# Patient Record
(demographics unavailable — no encounter records)

---

## 2024-11-11 NOTE — HISTORY OF PRESENT ILLNESS
[FreeTextEntry8] : Pt is an 84 y/o F with PMHx of breast CA, DVT who presents to the office today for eval of leg pain  RLE edema: - few days ago noticed unilateral edema - some "cramping" - has Hx of breast CA and is on Tamoxifen - she has HX of DVT ~8 years ago - no redness, rashes, warmth

## 2024-11-11 NOTE — END OF VISIT
[Time Spent: ___ minutes] : I have spent [unfilled] minutes of time on the encounter which excludes teaching and separately reported services. [FreeTextEntry3] : I, Dr. Rodriguez, personally performed the evaluation and management (E/M) services for this established patient who presents today with (a) new problem(s)/exacerbation of (an) existing condition(s).  That E/M includes conducting the examination, assessing all new/exacerbated conditions, and establishing a new plan of care.  Today, my PA, Stephanie Cheney, was here to observe my evaluation and management services for this new problem/exacerbated condition to be followed going forward.

## 2024-12-09 NOTE — HISTORY OF PRESENT ILLNESS
[de-identified] : 83F with hx of osteoporosis, Breast cancer (1993, 2011, 2024) presents for initial consulation for DVT.. DVT US 11/4- Acute deep venous thrombosis of the distal RIGHT femoral vein, RIGHT popliteal vein, RIGHT posterior tibial vein. Medical hx: recently with breast cancer dx (has had recurrences) s/p RT and now on tamoxifen (5mg daily) since March. Pt did not feel well (sxs of weakness and sore legs and had stopped for a few weeks). Then placed on tamoxifen again. Weekend prior to 11/11- noticed R leg with significant swelling. Started 11/11 Eliquis. Pt has been off tamoxifen since diagnosis then went to second oncologist who has started her on AI (reports patient is on Prolia). Started anastrozole on Saturday. Today, reports improvement in leg pain and swelling. Denies LOC, CP, SOB, abdominal pain, melena, hematochezia.   Low dose RT and lumpectomy ([facility])- Betty Previous DVT 11/2016 - bilateral partially occlussive thrombus w/i posterior tibial veins distal to popliteal; trifurcations (had broken pelvis 2 months prior to US- and had notice leg swelling since broken pelvis); Pt had a fall (off ladder) stayed in bed for 2 days; then called PCP and jhonny to ED --> went to rehab for 5 weeks for broken pelvis. S/p eliquis x 3 months Medications: Eliquis BID, prolia shots, anastrozole Surgeries: hysterectomy, bowel obstruction, lumpectomy x3 Allergies: none  Social hx: no tobacco use, social alcohol use family hx: mother (breast cancer), no family hx of clotting/bleeding disorders Colonoscopy- ~10 years ago WNL

## 2024-12-09 NOTE — ASSESSMENT
[FreeTextEntry1] : 83F with hx of osteoporosis, Breast cancer (1993, 2011, 2024) presents for initial consulation for DVT.. DVT US 11/4- Acute deep venous thrombosis of the distal RIGHT femoral vein, RIGHT popliteal vein, RIGHT posterior tibial vein. Medical hx: recently with breast cancer dx (has had recurrences) s/p RT and now on anastrazole.   # Acute R DVT -Pt currently on Eliquis since 11/11 - discussed continuation with AC for at least 6months; given high risk with recurrence/ malignancy may consider continuing at low dose  - pt will f/u in 2 months - Eliquis sent to pharmacy - f/u with onc regarding breast cancer treatment

## 2024-12-09 NOTE — HISTORY OF PRESENT ILLNESS
[de-identified] : 83F with hx of osteoporosis, Breast cancer (1993, 2011, 2024) presents for initial consulation for DVT.. DVT US 11/4- Acute deep venous thrombosis of the distal RIGHT femoral vein, RIGHT popliteal vein, RIGHT posterior tibial vein. Medical hx: recently with breast cancer dx (has had recurrences) s/p RT and now on tamoxifen (5mg daily) since March. Pt did not feel well (sxs of weakness and sore legs and had stopped for a few weeks). Then placed on tamoxifen again. Weekend prior to 11/11- noticed R leg with significant swelling. Started 11/11 Eliquis. Pt has been off tamoxifen since diagnosis then went to second oncologist who has started her on AI (reports patient is on Prolia). Started anastrozole on Saturday. Today, reports improvement in leg pain and swelling. Denies LOC, CP, SOB, abdominal pain, melena, hematochezia.   Low dose RT and lumpectomy ([facility])- Betty Previous DVT 11/2016 - bilateral partially occlussive thrombus w/i posterior tibial veins distal to popliteal; trifurcations (had broken pelvis 2 months prior to US- and had notice leg swelling since broken pelvis); Pt had a fall (off ladder) stayed in bed for 2 days; then called PCP and jhonny to ED --> went to rehab for 5 weeks for broken pelvis. S/p eliquis x 3 months Medications: Eliquis BID, prolia shots, anastrozole Surgeries: hysterectomy, bowel obstruction, lumpectomy x3 Allergies: none  Social hx: no tobacco use, social alcohol use family hx: mother (breast cancer), no family hx of clotting/bleeding disorders Colonoscopy- ~10 years ago WNL

## 2025-02-10 NOTE — HISTORY OF PRESENT ILLNESS
[de-identified] : 83F with hx of osteoporosis, Breast cancer (1993, 2011, 2024) presents for initial consulation for DVT.. DVT US 11/4- Acute deep venous thrombosis of the distal RIGHT femoral vein, RIGHT popliteal vein, RIGHT posterior tibial vein. Medical hx: recently with breast cancer dx (has had recurrences) s/p RT and now on tamoxifen (5mg daily) since March. Pt did not feel well (sxs of weakness and sore legs and had stopped for a few weeks). Then placed on tamoxifen again. Weekend prior to 11/11- noticed R leg with significant swelling. Started 11/11 Eliquis. Pt has been off tamoxifen since diagnosis then went to second oncologist who has started her on AI (reports patient is on Prolia). Started anastrozole on Saturday.   Low dose RT and lumpectomy ([facility])- Betty Previous DVT 11/2016 - bilateral partially occlussive thrombus w/i posterior tibial veins distal to popliteal; trifurcations (had broken pelvis 2 months prior to US- and had notice leg swelling since broken pelvis); Pt had a fall (off ladder) stayed in bed for 2 days; then called PCP and jhonny to ED --> went to rehab for 5 weeks for broken pelvis. S/p eliquis x 3 months Medications: Eliquis BID, prolia shots, anastrozole Surgeries: hysterectomy, bowel obstruction, lumpectomy x3 Allergies: none Social hx: no tobacco use, social alcohol use family hx: mother (breast cancer), no family hx of clotting/bleeding disorders Colonoscopy- ~10 years ago WNL.   [de-identified] : Reports BRBPR since last week s/p BM - reports 5-6 episodes with clotting. Last episode of bleeding this morning - noted bright blood in toilet. No LOC, dizziness, SOB, CP, abdominal pain. Has been compliant on Eliquis 5 BID. Reports RLE with continue swelling. No pain or erythema. Has been mobile/ active. Currently doing well on anastrozole 5mg daily - no side effects. Prior hx of below the knee DVT s/p injury (provoked). Unsure of hx of hemorrhoids

## 2025-02-10 NOTE — ASSESSMENT
[FreeTextEntry1] : 83F with hx of osteoporosis, Breast cancer (1993, 2011, 2024), prior RLE DVT (provoked i/s/o pelvic fracture) presents for DVT F/U. DVT US 11/4- Acute deep venous thrombosis of the distal RIGHT femoral vein, RIGHT popliteal vein, RIGHT posterior tibial vein.   # Acute R DVT # BRBPR -Pt on Eliquis since 11/11 - discussion was initially patient may have to continue on AC d/t oncology concern for breast cancer recurrence- f/u today for discussion of duration. However, low concern for recurrence per patient discussion with oncology and likely VTE i/s/o tamoxifen use now switched to anastrozole  - today patient has completed 3 months - and now reports BRBPR - given symptoms patient recommended to discontinue Eliquis. Will not require further AC for DVT  - US RLE ordered to assess DVT - last episode of BRBPR this morning  - ****CBC today with Hgb 12.1 --> 9.5 - denies LOC, dizziness, CP, palpitations. With bleeding noted this AM. Pt recommended to go to ED. Eliquis last taken 8am 2/10/24   F/u in 2 weeks

## 2025-02-24 NOTE — PHYSICAL EXAM
[Fully active, able to carry on all pre-disease performance without restriction] : Status 0 - Fully active, able to carry on all pre-disease performance without restriction [Normal] : affect appropriate [de-identified] : normal WoB, CTAB [de-identified] : RRR

## 2025-02-24 NOTE — HISTORY OF PRESENT ILLNESS
[de-identified] : Dulce Maria Raymundo is an 83-year-old woman with history of ER+ breast cancer status post T+C and radiation in 2011 and now on anastrazole (with repeat lumpectomy and radiation 2024) presenting in the setting of newly diagnosed likely metastatic colorectal cancer with disease to the liver and complicated by DVT.   She originally presented to [facility] 2/11/2025 in the setting of rectal bleeding with significant drop in hemoglobin. There, she was found to have a colonoscopy with a high rectal tumor (mod diff, KAYLIN rectal adenocarcinoma). Advanced imaging identified multifocal hepatic metastases in addition to a pancreatic lesion (biopsy c/w low grade NET). Liver lesion biopsy confirmed to be adenocarcinoma, however GATA3 and CDX2 staining is pending to differentiate colorectal from breast origin. Treatment pending result of pathology staining.   Overall well and asymptomatic except for minor shortness of breath. Remains off apixaban at this time, but does not note any new swelling in her b/l lower extremities or worsening in her shortness of breath. Overall functional.  No toxic habits or family history of cancer.    2/12/25: Colonoscopy (Colonoscopy biopsy consistent with KAYLIN mod diff invasive adenocarcinoma at 15 cm from anal verge, mod diff) 2/14/25: EGD with pancreatic lesion biopsy (Consistent with low grade pancreatic NET) 2/21/25: Liver Lesion Biopsy- pending  Sites: Hepatic, rectosigmoid Treatment: None (on anastrazole for ER+ breast cancer) NGS: [facility] pending   [de-identified] : Stopped eliquis 2/10 d/t rectal bleeding no more rectal bleeding + anemia + SOB started 2 weeks ago w/ rectal bleeding. w/ exertion + chills (heating problem in apartment though) denies pain Not applicable

## 2025-02-24 NOTE — RESULTS/DATA
[FreeTextEntry1] : 2/10/25: CT AP- 1. Study protocol not optimized for detection of active gastrointestinal bleeding. Within this limitation, no hemorrhage is identified. 2. Several incidental findings, including: Suspected indeterminate 1.1 cm lesion at left kidney interpole. Pancreatic tail 3.5 cm unilocular cystic lesion. Diagnostic considerations include pancreatic pseudocyst and mucinous cystic neoplasm. Arterially enhancing 0.7 cm lesion in the left hepatic lobe. Recommend multiphase contrast-enhanced MR on nonemergent basis for further evaluation of these lesions.  2/12/25: Colonoscopy  2/12/25: MR Pelvis/Abdomen- *  0.7 cm flash filling hemangioma in the left lateral hepatic lobe corresponding to arterially enhancing focus on CT. *  Additional indeterminate hypovascular lesions in the right and left hepatic lobe are worrisome for metastases. *  3.3 cm annular soft tissue mass in the upper rectum suspicious for rectal neoplasm. No obstruction. *  3.9 x 3.6 cm unilocular thick-walled cystic lesion in the pancreatic tail with internal fluid level is suspicious for mucinous consistent neoplasm versus walled off pancreatic necrosis. Follow-up with GI for endoscopic evaluation. 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  2/13/25: CT Chest- 1.  Clear lungs. 2.  2.3 cm Inferior left thyroid gland nodule. Ultrasound recommended  2/14/25: Thyroid and Parathyroid US- Bilateral thyroid nodules. Consider follow-up ultrasound in one year. TI-RAD 3: Mildly suspicious (FNA if > 2.5 cm, Follow if > 1.5 cm)  2/14/25: EGD  2/14/25: Biopsy- Final Diagnosis 1.  Cecal polyps: -   Tubular adenoma(s) 2.  Ascending colon polyp: -   Tubular adenoma 3.  Transverse colon polyp: -   Tubular adenoma 4.  Rectal tumor, 15 cm from rectum: -   Invasive moderately differentiated colonic adenocarcinoma -   MMR immunohistochemistry shows intact proteins (MLH1, MSH2, MSH6 and PMS2)  2/14/25: Biopsy- Final Diagnosis 1. Fundus: -Gastric mucosa with foveolar hyperplasia  2/21/25: Liver Lesion Biopsy- pending

## 2025-02-24 NOTE — HISTORY OF PRESENT ILLNESS
[de-identified] : Dulce Maria Raymundo is an 83-year-old woman with history of ER+ breast cancer status post T+C and radiation in 2011 and now on anastrazole (with repeat lumpectomy and radiation 2024) presenting in the setting of newly diagnosed likely metastatic colorectal cancer with disease to the liver and complicated by DVT.   She originally presented to [facility] 2/11/2025 in the setting of rectal bleeding with significant drop in hemoglobin. There, she was found to have a colonoscopy with a high rectal tumor (mod diff, KAYLIN rectal adenocarcinoma). Advanced imaging identified multifocal hepatic metastases in addition to a pancreatic lesion (biopsy c/w low grade NET). Liver lesion biopsy confirmed to be adenocarcinoma, however GATA3 and CDX2 staining is pending to differentiate colorectal from breast origin. Treatment pending result of pathology staining.   Overall well and asymptomatic except for minor shortness of breath. Remains off apixaban at this time, but does not note any new swelling in her b/l lower extremities or worsening in her shortness of breath. Overall functional.  No toxic habits or family history of cancer.    2/12/25: Colonoscopy (Colonoscopy biopsy consistent with KAYLIN mod diff invasive adenocarcinoma at 15 cm from anal verge, mod diff) 2/14/25: EGD with pancreatic lesion biopsy (Consistent with low grade pancreatic NET) 2/21/25: Liver Lesion Biopsy- pending  Sites: Hepatic, rectosigmoid Treatment: None (on anastrazole for ER+ breast cancer) NGS: [facility] pending   [de-identified] : Stopped eliquis 2/10 d/t rectal bleeding no more rectal bleeding + anemia + SOB started 2 weeks ago w/ rectal bleeding. w/ exertion + chills (heating problem in apartment though) denies pain

## 2025-02-24 NOTE — HISTORY OF PRESENT ILLNESS
[de-identified] : Dulce Maria Raymundo is an 83-year-old woman with history of ER+ breast cancer status post T+C and radiation in 2011 and now on anastrazole (with repeat lumpectomy and radiation 2024) presenting in the setting of newly diagnosed likely metastatic colorectal cancer with disease to the liver and complicated by DVT.   She originally presented to [facility] 2/11/2025 in the setting of rectal bleeding with significant drop in hemoglobin. There, she was found to have a colonoscopy with a high rectal tumor (mod diff, KAYLIN rectal adenocarcinoma). Advanced imaging identified multifocal hepatic metastases in addition to a pancreatic lesion (biopsy c/w low grade NET). Liver lesion biopsy confirmed to be adenocarcinoma, however GATA3 and CDX2 staining is pending to differentiate colorectal from breast origin. Treatment pending result of pathology staining.   Overall well and asymptomatic except for minor shortness of breath. Remains off apixaban at this time, but does not note any new swelling in her b/l lower extremities or worsening in her shortness of breath. Overall functional.  No toxic habits or family history of cancer.    2/12/25: Colonoscopy (Colonoscopy biopsy consistent with KAYLIN mod diff invasive adenocarcinoma at 15 cm from anal verge, mod diff) 2/14/25: EGD with pancreatic lesion biopsy (Consistent with low grade pancreatic NET) 2/21/25: Liver Lesion Biopsy- pending  Sites: Hepatic, rectosigmoid Treatment: None (on anastrazole for ER+ breast cancer) NGS: [facility] pending   [de-identified] : Stopped eliquis 2/10 d/t rectal bleeding no more rectal bleeding + anemia + SOB started 2 weeks ago w/ rectal bleeding. w/ exertion + chills (heating problem in apartment though) denies pain

## 2025-02-24 NOTE — PHYSICAL EXAM
[Fully active, able to carry on all pre-disease performance without restriction] : Status 0 - Fully active, able to carry on all pre-disease performance without restriction [Normal] : affect appropriate [de-identified] : normal WoB, CTAB [de-identified] : RRR

## 2025-02-24 NOTE — PHYSICAL EXAM
[Fully active, able to carry on all pre-disease performance without restriction] : Status 0 - Fully active, able to carry on all pre-disease performance without restriction [Normal] : affect appropriate [de-identified] : normal WoB, CTAB [de-identified] : RRR

## 2025-02-24 NOTE — ASSESSMENT
[FreeTextEntry1] : Ms. Raymundo is an 83 year old woman with history of local ER+ breast cancer on endocrine therapy with recently diagnosed likely metastatic colorectal cancer; biopsy of the liver is pending to delineate metastatic colorectal from metastatic breast cancer. Today we discussed the natural history of her likely metastatic rectosigmoid cancer, the high probability that her hepatic lesions are related to her recently identified rectosigmoid mass, indications for treatment, and the hope that systemic therapy could lead to increased life expectancy and decreased symptoms. We discussed the palliative nature of therapy, that we could not eradicate this cancer and that systemic therapy is not a curative strategy. There are at least 3 metastatic hepatic lesions noted on PET, and likely more under the limit of detection; notably she is without active retroperitoneal lymph nodes; we are working to obtain her PET images for review. Given age, unlikely that DOUG will be an option.   For now, we discussed initiating systemic therapy with FOLFOX, and will add a monoclonal antibody following NGS results.   #Likely Metastatic Colorectal Cancer: - [facility] NGS pending - Pathology for site of origin of hepatic metastases pending - FOLFOX start plan for 3/10 - Port placement - Chemo teach and consent 2/24/2025 - No role for genetics or nutrition consult at this time  #History DVT: - Restart apixaban after C1  - Monitor for recurrent LE swelling or SoB  #Chemotherapy Induced Nausea - Zofran prescribed; to start +D3 after chemo    [Palliative] : Goals of care discussed with patient: Palliative

## 2025-02-24 NOTE — HISTORY OF PRESENT ILLNESS
[de-identified] : Dulce Maria Raymundo is an 83-year-old woman with history of ER+ breast cancer status post T+C and radiation in 2011 and now on anastrazole (with repeat lumpectomy and radiation 2024) presenting in the setting of newly diagnosed likely metastatic colorectal cancer with disease to the liver and complicated by DVT.   She originally presented to [facility] 2/11/2025 in the setting of rectal bleeding with significant drop in hemoglobin. There, she was found to have a colonoscopy with a high rectal tumor (mod diff, KAYLIN rectal adenocarcinoma). Advanced imaging identified multifocal hepatic metastases in addition to a pancreatic lesion (biopsy c/w low grade NET). Liver lesion biopsy confirmed to be adenocarcinoma, however GATA3 and CDX2 staining is pending to differentiate colorectal from breast origin. Treatment pending result of pathology staining.   Overall well and asymptomatic except for minor shortness of breath. Remains off apixaban at this time, but does not note any new swelling in her b/l lower extremities or worsening in her shortness of breath. Overall functional.  No toxic habits or family history of cancer.    2/12/25: Colonoscopy (Colonoscopy biopsy consistent with KAYLIN mod diff invasive adenocarcinoma at 15 cm from anal verge, mod diff) 2/14/25: EGD with pancreatic lesion biopsy (Consistent with low grade pancreatic NET) 2/21/25: Liver Lesion Biopsy- pending  Sites: Hepatic, rectosigmoid Treatment: None (on anastrazole for ER+ breast cancer) NGS: [facility] pending   [de-identified] : Stopped eliquis 2/10 d/t rectal bleeding no more rectal bleeding + anemia + SOB started 2 weeks ago w/ rectal bleeding. w/ exertion + chills (heating problem in apartment though) denies pain

## 2025-02-24 NOTE — PHYSICAL EXAM
[Fully active, able to carry on all pre-disease performance without restriction] : Status 0 - Fully active, able to carry on all pre-disease performance without restriction [Normal] : affect appropriate [de-identified] : normal WoB, CTAB [de-identified] : RRR

## 2025-03-12 NOTE — REVIEW OF SYSTEMS
[facility] to today's joint visit with [facility] [place] [place] primary nurse, Mauro Mcknight. Nurse and SW knocked and entered her apartment to visit. Pt was lying in her bed taking a nap but joined us out in the living room area to visit a few minutes after our arrival.  Pt was alert, fully oriented, pleasant and conversant throughout the visit (some continued mild forgetfulness noted at times). Pt reported no new concerns or needs. We again discussed the in-home caregiver program ([facility]) offered by the [facility] that can provided her with private caregivers at the time she feels like she needs additional assistance, etc. Pt is aware of and is in agreement with the scheduled family with her son Indio Molina next Monday at 4:00 PM. Pt was not wearing  life alert pendant which is connected to the safety/security system at the [facility] and SW again reminded her about wearing this for safety reasons and she expressed understanding. Nurse and SW provided emotional support for Pt via active listening along with validation of her feelings/concerns, education and reassurance. Pt expressed appreciation for the visit and continued support. SW will increase visits for additional support as indicated. No changes in the plan of care. SW will continue to provide emotional support and assessment of psychosocial and bereavement concerns and needs. [Diarrhea: Grade 0] : Diarrhea: Grade 0 [Negative] : Allergic/Immunologic

## 2025-03-17 NOTE — PHYSICAL EXAM
[Fully active, able to carry on all pre-disease performance without restriction] : Status 0 - Fully active, able to carry on all pre-disease performance without restriction [Normal] : affect appropriate [de-identified] : normal WoB, CTAB [de-identified] : RRR [de-identified] : right chest mediport, site clean without erythema

## 2025-03-17 NOTE — ASSESSMENT
[Palliative] : Goals of care discussed with patient: Palliative [FreeTextEntry1] : Ms. Raymundo is an 83 year old woman with history of local ER+ breast cancer on endocrine therapy with recently diagnosed likely metastatic colorectal cancer; biopsy of the liver is pending to delineate metastatic colorectal from metastatic breast cancer. Today we discussed the natural history of her likely metastatic rectosigmoid cancer, the high probability that her hepatic lesions are related to her recently identified rectosigmoid mass, indications for treatment, and the hope that systemic therapy could lead to increased life expectancy and decreased symptoms. We discussed the palliative nature of therapy, that we could not eradicate this cancer and that systemic therapy is not a curative strategy. There are at least 3 metastatic hepatic lesions noted on PET, and likely more under the limit of detection; notably she is without active retroperitoneal lymph nodes; we are working to obtain her PET images for review. Given age, unlikely that DOUG will be an option.   For now, we discussed initiating systemic therapy with FOLFOX, and will add a monoclonal antibody following NGS results. C1 FOLFOX 3/3/25, tolerated well with minimal side effects  3/17/25: Discussed results of molecular testing. KAYLIN. KRAS/NRAS wildtype. Discussed addition of mab EGFR therapy with panitumumab to FOLFOX backbone.   # Metastatic Colorectal Cancer (KAYLIN, KRAS/NRAS WT): - Chemo teach and consent 2/24/2025. Panitumumab consent obtained 3/17 - FOLFOX C1 3/10/25, labs reviewed cleared for C2 FOLFOX+panitumumab - Moisturizer (cerave), doxycycline 100mg daily with food (rx sent) to prevent dermatologic toxicities that may occur - Plan for 3 to 6 months of treatment, then re-imaging to assess response to treatment  #History Breast Caner: - Had [facility] [place] [place] appointment. stopped anastrozole (reported 2/2 risk of blood clots)   #DVT: Completed 3 months of treatment. Given risk of thrombosis with malignancy, will provide prophylactic dosing of eliquis 2.5 mg BID. - Monitor for recurrent LE swelling or SoB - Repeat ultrasound to monitor for residual clot/progression of clot off of eliquis - Return precautions/red flags discussed  #Chemotherapy Induced Nausea - Zofran prescribed; to start +D3 after chemo   Seen with fellow Dr. Pringle.

## 2025-03-17 NOTE — PHYSICAL EXAM
[Fully active, able to carry on all pre-disease performance without restriction] : Status 0 - Fully active, able to carry on all pre-disease performance without restriction [Normal] : affect appropriate [de-identified] : normal WoB, CTAB [de-identified] : RRR [de-identified] : right chest mediport, site clean without erythema

## 2025-03-17 NOTE — HISTORY OF PRESENT ILLNESS
[de-identified] : Dulce Maria Raymundo is an 83-year-old woman with history of ER+ breast cancer status post T+C and radiation in 2011 and now on anastrazole (with repeat lumpectomy and radiation 2024) presenting in the setting of newly diagnosed likely metastatic colorectal cancer with disease to the liver and complicated by DVT.   She originally presented to [facility] 2/11/2025 in the setting of rectal bleeding with significant drop in hemoglobin. There, she was found to have a colonoscopy with a high rectal tumor (mod diff, KAYLIN rectal adenocarcinoma). Advanced imaging identified multifocal hepatic metastases in addition to a pancreatic lesion (biopsy c/w low grade NET). Liver lesion biopsy confirmed to be adenocarcinoma, however GATA3 and CDX2 staining is pending to differentiate colorectal from breast origin. Treatment pending result of pathology staining.   Overall well and asymptomatic except for minor shortness of breath. Remains off apixaban at this time, but does not note any new swelling in her b/l lower extremities or worsening in her shortness of breath. Overall functional.  No toxic habits or family history of cancer.   2/12/25: Colonoscopy (Colonoscopy biopsy consistent with KAYLIN mod diff invasive adenocarcinoma at 15 cm from anal verge, mod diff) 2/14/25: EGD with pancreatic lesion biopsy (Consistent with low grade pancreatic NET) 2/21/25: Liver Lesion Biopsy: Metastatic adenocarcinoma, consistent with colorectal primary 3/3/25: C1D1 FOLFOX 3/17/25: C2D1 FOLFOX + panitumumab. Informed consent obtained for panitumumab  Sites: Hepatic, rectosigmoid Treatment: None (on anastrazole, now off, for ER+ breast cancer) NGS: [facility] (2/24/25) on primary tissue. KRAS/NRAS WT, APC splice site 835-*A>G, FBXW7, TP53. KAYLIN.  [de-identified] : 3/17/25: Here for C2 FOLFOX. Discussed addition of panitumumab. Tolerated cycle 1 well with minimal side effects, some loose stool, no constipation. Energy level and appetite stable. Haven't needed PRN medications for nausea. No peripheral neuropathy

## 2025-03-17 NOTE — ASSESSMENT
[Palliative] : Goals of care discussed with patient: Palliative [FreeTextEntry1] : Ms. Raymundo is an 83 year old woman with history of local ER+ breast cancer on endocrine therapy with recently diagnosed likely metastatic colorectal cancer; biopsy of the liver is pending to delineate metastatic colorectal from metastatic breast cancer. Today we discussed the natural history of her likely metastatic rectosigmoid cancer, the high probability that her hepatic lesions are related to her recently identified rectosigmoid mass, indications for treatment, and the hope that systemic therapy could lead to increased life expectancy and decreased symptoms. We discussed the palliative nature of therapy, that we could not eradicate this cancer and that systemic therapy is not a curative strategy. There are at least 3 metastatic hepatic lesions noted on PET, and likely more under the limit of detection; notably she is without active retroperitoneal lymph nodes; we are working to obtain her PET images for review. Given age, unlikely that DOUG will be an option.   For now, we discussed initiating systemic therapy with FOLFOX, and will add a monoclonal antibody following NGS results. C1 FOLFOX 3/3/25, tolerated well with minimal side effects  3/17/25: Discussed results of molecular testing. KAYLIN. KRAS/NRAS wildtype. Discussed addition of mab EGFR therapy with panitumumab to FOLFOX backbone.   # Metastatic Colorectal Cancer (KAYLIN, KRAS/NRAS WT): - Chemo teach and consent 2/24/2025. Panitumumab consent obtained 3/17 - FOLFOX C1 3/10/25, labs reviewed cleared for C2 FOLFOX+panitumumab - Moisturizer (cerave), doxycycline 100mg daily with food (rx sent) to prevent dermatologic toxicities that may occur - Plan for 3 to 6 months of treatment, then re-imaging to assess response to treatment  #History Breast Caner: - Had [facility] appointment. stopped anastrozole (reported 2/2 risk of blood clots)   #DVT: Completed 3 months of treatment. Given risk of thrombosis with malignancy, will provide prophylactic dosing of eliquis 2.5 mg BID. - Monitor for recurrent LE swelling or SoB - Repeat ultrasound to monitor for residual clot/progression of clot off of eliquis - Return precautions/red flags discussed  #Chemotherapy Induced Nausea - Zofran prescribed; to start +D3 after chemo   Seen with fellow Dr. Pringle.

## 2025-03-17 NOTE — HISTORY OF PRESENT ILLNESS
[de-identified] : Dulce Maria Raymundo is an 83-year-old woman with history of ER+ breast cancer status post T+C and radiation in 2011 and now on anastrazole (with repeat lumpectomy and radiation 2024) presenting in the setting of newly diagnosed likely metastatic colorectal cancer with disease to the liver and complicated by DVT.   She originally presented to [facility] 2/11/2025 in the setting of rectal bleeding with significant drop in hemoglobin. There, she was found to have a colonoscopy with a high rectal tumor (mod diff, KAYLIN rectal adenocarcinoma). Advanced imaging identified multifocal hepatic metastases in addition to a pancreatic lesion (biopsy c/w low grade NET). Liver lesion biopsy confirmed to be adenocarcinoma, however GATA3 and CDX2 staining is pending to differentiate colorectal from breast origin. Treatment pending result of pathology staining.   Overall well and asymptomatic except for minor shortness of breath. Remains off apixaban at this time, but does not note any new swelling in her b/l lower extremities or worsening in her shortness of breath. Overall functional.  No toxic habits or family history of cancer.   2/12/25: Colonoscopy (Colonoscopy biopsy consistent with KAYLIN mod diff invasive adenocarcinoma at 15 cm from anal verge, mod diff) 2/14/25: EGD with pancreatic lesion biopsy (Consistent with low grade pancreatic NET) 2/21/25: Liver Lesion Biopsy: Metastatic adenocarcinoma, consistent with colorectal primary 3/3/25: C1D1 FOLFOX 3/17/25: C2D1 FOLFOX + panitumumab. Informed consent obtained for panitumumab  Sites: Hepatic, rectosigmoid Treatment: None (on anastrazole, now off, for ER+ breast cancer) NGS: [facility] (2/24/25) on primary tissue. KRAS/NRAS WT, APC splice site 835-*A>G, FBXW7, TP53. KAYLIN.  [de-identified] : 3/17/25: Here for C2 FOLFOX. Discussed addition of panitumumab. Tolerated cycle 1 well with minimal side effects, some loose stool, no constipation. Energy level and appetite stable. Haven't needed PRN medications for nausea. No peripheral neuropathy

## 2025-03-17 NOTE — HISTORY OF PRESENT ILLNESS
[de-identified] : Dulce Maria Raymundo is an 83-year-old woman with history of ER+ breast cancer status post T+C and radiation in 2011 and now on anastrazole (with repeat lumpectomy and radiation 2024) presenting in the setting of newly diagnosed likely metastatic colorectal cancer with disease to the liver and complicated by DVT.   She originally presented to [facility] 2/11/2025 in the setting of rectal bleeding with significant drop in hemoglobin. There, she was found to have a colonoscopy with a high rectal tumor (mod diff, KAYLIN rectal adenocarcinoma). Advanced imaging identified multifocal hepatic metastases in addition to a pancreatic lesion (biopsy c/w low grade NET). Liver lesion biopsy confirmed to be adenocarcinoma, however GATA3 and CDX2 staining is pending to differentiate colorectal from breast origin. Treatment pending result of pathology staining.   Overall well and asymptomatic except for minor shortness of breath. Remains off apixaban at this time, but does not note any new swelling in her b/l lower extremities or worsening in her shortness of breath. Overall functional.  No toxic habits or family history of cancer.   2/12/25: Colonoscopy (Colonoscopy biopsy consistent with KAYLIN mod diff invasive adenocarcinoma at 15 cm from anal verge, mod diff) 2/14/25: EGD with pancreatic lesion biopsy (Consistent with low grade pancreatic NET) 2/21/25: Liver Lesion Biopsy: Metastatic adenocarcinoma, consistent with colorectal primary 3/3/25: C1D1 FOLFOX 3/17/25: C2D1 FOLFOX + panitumumab. Informed consent obtained for panitumumab  Sites: Hepatic, rectosigmoid Treatment: None (on anastrazole, now off, for ER+ breast cancer) NGS: [facility] (2/24/25) on primary tissue. KRAS/NRAS WT, APC splice site 835-*A>G, FBXW7, TP53. KAYLIN.  [de-identified] : 3/17/25: Here for C2 FOLFOX. Discussed addition of panitumumab. Tolerated cycle 1 well with minimal side effects, some loose stool, no constipation. Energy level and appetite stable. Haven't needed PRN medications for nausea. No peripheral neuropathy

## 2025-03-17 NOTE — PHYSICAL EXAM
[Fully active, able to carry on all pre-disease performance without restriction] : Status 0 - Fully active, able to carry on all pre-disease performance without restriction [Normal] : affect appropriate [de-identified] : normal WoB, CTAB [de-identified] : RRR [de-identified] : right chest mediport, site clean without erythema

## 2025-03-17 NOTE — ASSESSMENT
[address] [employer] [address] History and Physical       Attending Physician: No att. providers found    Primary Care: No primary care provider on file. Referring MD: Lori Whittaker MD  [address]  [place],  [address]    Name: Shannon Soto :  1955  MRN:  [id number]    Admission: 2022      Date: 2022    Reason for Admission: Thrombocytopenia      History of Present Illness: Farhan Griffin a 68 yo male w/ h/o Kappa Light Chain Multiple Myeloma (Dx 10/2018).   He was initially diagnosed with myeloma 10/2018 after presenting to the ED following routine workplace physical and labs revealed JUDY, anemia and thrombocytopenia (SCr 4.6, Hgb 7.8 & platelets of 39V). He was admitted to [facility] for additional work-up and was found to have kappa light chain multiple myeloma and nephropathy.       He presents to [facility] Infusion today with Thrombocytopenia, will receive Plt transfusion. He feels well today, denies fever, chills, bleeding, or GI changes. Eating and drinking well.        Past Surgical History:   Procedure Laterality Date    IR TUNNELED CATHETER PLACEMENT GREATER THAN 5 YEARS  2022    IR TUNNELED CATHETER PLACEMENT GREATER THAN 5 YEARS 2022 [facility]       Past Medical History:   Diagnosis Date    Cancer ([facility] [address]     ESRD (end stage renal disease) on dialysis ([facility] [address]     Mon-Wed-Fri       Prior to Admission medications    Medication Sig Start Date End Date Taking?  Authorizing Provider   Magnesium Oxide 400 MG CAPS Take 400 mg by mouth 2 times daily 22   George Lai APRN - NP   allopurinol (ZYLOPRIM) 100 MG tablet Take 2 tablets by mouth daily 22   George Lai APRN - NP   acyclovir (ZOVIRAX) 400 MG tablet Take 1 tablet by mouth 2 times daily 3/30/22   George Lai APRN - NP   fluconazole (DIFLUCAN) 100 MG tablet Take 1 tablet by mouth daily 3/29/22 4/28/22  George Lai APRN - NP   amLODIPine ([address]) [Palliative] : Goals of care discussed with patient: Palliative 5 MG tablet Take 1 tablet by mouth daily 3/28/22   Joy Reyes MD   pantoprazole sodium (PROTONIX) 40 MG PACK packet Take 40 mg by mouth every morning (before breakfast)  Patient not taking: Reported on 4/18/2022    Historical Provider, MD   Calcium Citrate-Vitamin D (CALCIUM + VIT D, BARIATRIC ADVANTAGE, CHEWABLE TABLET) Take 1 tablet by mouth daily 3/16/22   KIMBERLEY Felix - NP   levETIRAcetam (KEPPRA) 500 MG tablet Take 1 tablet by mouth every 12 hours 1/21/22   Brianne Rucker MD   sodium bicarbonate 650 MG tablet Take 650 mg by mouth daily    Historical Provider, MD   prochlorperazine (COMPAZINE) 10 MG tablet Take 10 mg by mouth every 6 hours as needed   Patient not taking: Reported on 4/18/2022    Historical Provider, MD   levoFLOXacin (LEVAQUIN) 250 MG tablet Take 250 mg by mouth daily For prophylaxis during chemotherapy    Historical Provider, MD       Allergies   Allergen Reactions    Decadron [Dexamethasone]      Patient is receiving CAR-T. No steroids unless approved by [facility] physician.        Family History   Problem Relation Age of Onset    Cancer Father         Social History     Socioeconomic History    Marital status: Life Partner     Spouse name: Elva Reeder Number of children: Not on file    Years of education: Not on file    Highest education level: Not on file   Occupational History    Not on file   Tobacco Use    Smoking status: Never Smoker    Smokeless tobacco: Never Used   Substance and Sexual Activity    Alcohol use: Not Currently    Drug use: Never    Sexual activity: Not Currently     Partners: Female   Other Topics Concern    Not on file   Social History Narrative    Not on file     Social Determinants of Health     Financial Resource Strain:     Difficulty of Paying Living Expenses: Not on file   Food Insecurity:     Worried About Running Out of Food in the Last Year: Not on file    Joel of Food in the Last Year: Not on file   Transportation Needs: [FreeTextEntry1] : Ms. Raymundo is an 83 year old woman with history of local ER+ breast cancer on endocrine therapy with recently diagnosed likely metastatic colorectal cancer; biopsy of the liver is pending to delineate metastatic colorectal from metastatic breast cancer. Today we discussed the natural history of her likely metastatic rectosigmoid cancer, the high probability that her hepatic lesions are related to her recently identified rectosigmoid mass, indications for treatment, and the hope that systemic therapy could lead to increased life expectancy and decreased symptoms. We discussed the palliative nature of therapy, that we could not eradicate this cancer and that systemic therapy is not a curative strategy. There are at least 3 metastatic hepatic lesions noted on PET, and likely more under the limit of detection; notably she is without active retroperitoneal lymph nodes; we are working to obtain her PET images for review. Given age, unlikely that DOUG will be an option.   For now, we discussed initiating systemic therapy with FOLFOX, and will add a monoclonal antibody following NGS results. C1 FOLFOX 3/3/25, tolerated well with minimal side effects  3/17/25: Discussed results of molecular testing. KAYLIN. KRAS/NRAS wildtype. Discussed addition of mab EGFR therapy with panitumumab to FOLFOX backbone.   # Metastatic Colorectal Cancer (KAYLIN, KRAS/NRAS WT): - Chemo teach and consent 2/24/2025. Panitumumab consent obtained 3/17 - FOLFOX C1 3/10/25, labs reviewed cleared for C2 FOLFOX+panitumumab - Moisturizer (cerave), doxycycline 100mg daily with food (rx sent) to prevent dermatologic toxicities that may occur - Plan for 3 to 6 months of treatment, then re-imaging to assess response to treatment  #History Breast Caner: - Had [facility] appointment. stopped anastrozole (reported 2/2 risk of blood clots)   #DVT: Completed 3 months of treatment. Given risk of thrombosis with malignancy, will provide prophylactic dosing of eliquis 2.5 mg BID. - Monitor for recurrent LE swelling or SoB - Repeat ultrasound to monitor for residual clot/progression of clot off of eliquis - Return precautions/red flags discussed  #Chemotherapy Induced Nausea - Zofran prescribed; to start +D3 after chemo   Seen with fellow Dr. Pringle.   Lack of Transportation (Medical): Not on file    Lack of Transportation (Non-Medical): Not on file   Physical Activity:     Days of Exercise per Week: Not on file    Minutes of Exercise per Session: Not on file   Stress:     Feeling of Stress : Not on file   Social Connections:     Frequency of Communication with Friends and Family: Not on file    Frequency of Social Gatherings with Friends and Family: Not on file    Attends Catholic Services: Not on file    Active Member of [address] [place] [address] or Organizations: Not on file    Attends Club or Organization Meetings: Not on file    Marital Status: Not on file   Intimate Partner Violence:     Fear of Current or Ex-Partner: Not on file    Emotionally Abused: Not on file    Physically Abused: Not on file    Sexually Abused: Not on file   Housing Stability:     Unable to Pay for Housing in the Last Year: Not on file    Number of [place] in the Last Year: Not on file    Unstable Housing in the Last Year: Not on file        ROS:  As noted above, otherwise remainder of 10-point ROS negative    Physical Exam:     Vital Signs:  BP (!) 145/75   Pulse 88   Temp 98.7 °F (37.1 °C) (Oral)   Resp 16   SpO2 100%     Weight:    Wt Readings from Last 3 Encounters:   04/24/22 251 lb 12.3 oz (114.2 kg)   04/21/22 254 lb 3.1 oz (115.3 kg)   04/20/22 254 lb 6.6 oz (115.4 kg)       KPS: 90% Able to carry on normal activity; minor signs or symptoms of disease    ECOG PS:  (1) Restricted in physically strenuous activity, ambulatory and able to do work of light nature    General: Awake, alert and oriented.   HEENT: normocephalic, PERRL, no scleral erythema or icterus, Oral mucosa moist and intact, throat clear  NECK: supple without palpable adenopathy  BACK: Straight negative CVAT  SKIN: warm dry and intact without lesions rashes or masses  CHEST: CTA bilaterally without use of accessory muscles  CV: Normal S1 S2, RRR, no MRG  ABD: NT ND normoactive BS, no palpable masses or hepatosplenomegaly  EXTREMITIES: without edema, denies calf tenderness  NEURO: CN II - XII grossly intact  CATHETER: CVC    Laboratory Data:   CBC:   Recent Labs     04/24/22  0843   WBC 1.0*   HGB 7.3*   HCT 21.2*   MCV 95.9   PLT 22*     BMP/Mag:  Recent Labs     04/24/22  0843      K 3.9   *   CO2 22   PHOS 3.3   BUN 38*   CREATININE 2.7*     LIVP:   No results for input(s): AST, ALT, LIPASE, BILIDIR, BILITOT, ALKPHOS in the last 72 hours. Invalid input(s): AMYLASE,  ALB  Coags:   No results for input(s): PROTIME, INR, APTT in the last 72 hours. Uric Acid   No results for input(s): LABURIC in the last 72 hours. PROBLEM LIST:          1. Thrombocytopenia       TREATMENT:                1. Plt transfusion       ASSESSMENT AND PLAN:           1. Kappa Light Chain Multiple Myeloma:  Currently in [facility]  - BM biopsy: 1/12/22.  20% plasma cells . Jewell Umaña p53 positive 79.5% of cells  -1/28/22 Serum KFLC 1662.71, Lambda 3.05   K/L ratio 545.15  -2/24/22 Serum KFLC 1919.12, Lambda 2.54, K/L raio 755.56  -s/p Fludarabine and Cytoxan 3/16/22-3/18/22  -Post CAR-T infusion staging:  PET/CT, BM bx with Flow, FISH, CG, MMP     2. Heme:   - Transfuse for Hgb < 7 and Platelets < 10 K.   - Plt transfusion today    Thrombocytopenia Precautions in place. Patient showing no signs or symptoms of active bleeding. Patient transfused blood products per orders - see flowsheet. Patient verbalizes understanding of all instructions.             - Disposition: Once blood products infused, D/C to follow up at [facility]      The patient was seen and examined by Dr. Hilaria Figueroa. This admission history and physical has been discussed and agreed upon by Dr. Hilaria Figueroa.      KIMBERLEY Feliciano - NP

## 2025-03-31 NOTE — PHYSICAL EXAM
[Fully active, able to carry on all pre-disease performance without restriction] : Status 0 - Fully active, able to carry on all pre-disease performance without restriction [Normal] : affect appropriate [de-identified] : normal WoB, CTAB [de-identified] : RRR [de-identified] : right chest mediport, site clean without erythema

## 2025-03-31 NOTE — PHYSICAL EXAM
[Fully active, able to carry on all pre-disease performance without restriction] : Status 0 - Fully active, able to carry on all pre-disease performance without restriction [Normal] : affect appropriate [de-identified] : normal WoB, CTAB [de-identified] : RRR [de-identified] : right chest mediport, site clean without erythema

## 2025-03-31 NOTE — HISTORY OF PRESENT ILLNESS
[de-identified] : Dulce Maria Raymundo is an 83-year-old woman with history of ER+ breast cancer status post T+C and radiation in 2011 and now on anastrazole (with repeat lumpectomy and radiation 2024) presenting in the setting of newly diagnosed likely metastatic colorectal cancer with disease to the liver and complicated by DVT.   She originally presented to [facility] 2/11/2025 in the setting of rectal bleeding with significant drop in hemoglobin. There, she was found to have a colonoscopy with a high rectal tumor (mod diff, KAYLIN rectal adenocarcinoma). Advanced imaging identified multifocal hepatic metastases in addition to a pancreatic lesion (biopsy c/w low grade NET). Liver lesion biopsy confirmed to be adenocarcinoma, however GATA3 and CDX2 staining is pending to differentiate colorectal from breast origin. Treatment pending result of pathology staining.   Overall well and asymptomatic except for minor shortness of breath. Remains off apixaban at this time, but does not note any new swelling in her b/l lower extremities or worsening in her shortness of breath. Overall functional.  No toxic habits or family history of cancer.   2/12/25: Colonoscopy (Colonoscopy biopsy consistent with KAYLIN mod diff invasive adenocarcinoma at 15 cm from anal verge, mod diff) 2/14/25: EGD with pancreatic lesion biopsy (Consistent with low grade pancreatic NET) 2/21/25: Liver Lesion Biopsy: Metastatic adenocarcinoma, consistent with colorectal primary 3/3/25: C1D1 FOLFOX 3/17/25: C2D1 FOLFOX + panitumumab. Informed consent obtained for panitumumab  Sites: Hepatic, rectosigmoid Treatment: None (on anastrazole, now off, for ER+ breast cancer) NGS: [facility] (2/24/25) on primary tissue. KRAS/NRAS WT, APC splice site 835-*A>G, FBXW7, TP53. KAYLIN.  [de-identified] : 3/17/25: Here for C2 FOLFOX. Discussed addition of panitumumab. Tolerated cycle 1 well with minimal side effects, some loose stool, no constipation. Energy level and appetite stable. Haven't needed PRN medications for nausea. No peripheral neuropathy  3/31/25: Here for C3. Slight pre-meal nausea. Feeling a little bit of numbness/cold sensitivity in fingers.  Sores in mouth and cracked lips. Metallic taste on tongue. Not interfering with eating Watery eyes after treatment Dry eyes and itching eyes. Thursday after.  Eye doctor: not concerned. Saline solution.  Laying down burning sensation in chest. Heartburn.

## 2025-03-31 NOTE — ASSESSMENT
[Palliative] : Goals of care discussed with patient: Palliative [FreeTextEntry1] : Ms. Raymundo is an 83 year old woman with history of local ER+ breast cancer on endocrine therapy with recently diagnosed likely metastatic colorectal cancer; biopsy of the liver is pending to delineate metastatic colorectal from metastatic breast cancer. Today we discussed the natural history of her likely metastatic rectosigmoid cancer, the high probability that her hepatic lesions are related to her recently identified rectosigmoid mass, indications for treatment, and the hope that systemic therapy could lead to increased life expectancy and decreased symptoms. We discussed the palliative nature of therapy, that we could not eradicate this cancer and that systemic therapy is not a curative strategy. There are at least 3 metastatic hepatic lesions noted on PET, and likely more under the limit of detection; notably she is without active retroperitoneal lymph nodes; we are working to obtain her PET images for review. Given age, unlikely that DOUG will be an option.   For now, we discussed initiating systemic therapy with FOLFOX, and will add a monoclonal antibody following NGS results. C1 FOLFOX 3/3/25, tolerated well with minimal side effects  3/17/25: Discussed results of molecular testing. KAYLIN. KRAS/NRAS wildtype. Discussed addition of mab EGFR therapy with panitumumab to FOLFOX backbone.   3/31/25: Here for C3. Tolerating therapy well. No complaints.   # Metastatic Colorectal Cancer (KAYLIN, KRAS/NRAS WT): - Chemo teach and consent 2/24/2025. Panitumumab consent obtained 3/17 - FOLFOX C1 3/10/25, labs reviewed cleared for C3 FOLFOX+panitumumab - Moisturizer (cerave), doxycycline 100mg daily with food (rx sent) to prevent dermatologic toxicities that may occur - Plan for 3 to 6 months of treatment, then re-imaging to assess response to treatment  #History Breast Caner: - Had [facility] appointment. stopped anastrozole (reported 2/2 risk of blood clots)   #DVT: Completed 3 months of treatment. Given risk of thrombosis with malignancy, will provide prophylactic dosing of eliquis 2.5 mg BID. - Monitor for recurrent LE swelling or SoB - Return precautions/red flags discussed  #Chemotherapy Induced Nausea - Zofran prescribed; to start +D3 after chemo   Seen with fellow Dr. Pringle.

## 2025-03-31 NOTE — HISTORY OF PRESENT ILLNESS
[de-identified] : Dulce Maria Raymundo is an 83-year-old woman with history of ER+ breast cancer status post T+C and radiation in 2011 and now on anastrazole (with repeat lumpectomy and radiation 2024) presenting in the setting of newly diagnosed likely metastatic colorectal cancer with disease to the liver and complicated by DVT.   She originally presented to [facility] 2/11/2025 in the setting of rectal bleeding with significant drop in hemoglobin. There, she was found to have a colonoscopy with a high rectal tumor (mod diff, KAYLIN rectal adenocarcinoma). Advanced imaging identified multifocal hepatic metastases in addition to a pancreatic lesion (biopsy c/w low grade NET). Liver lesion biopsy confirmed to be adenocarcinoma, however GATA3 and CDX2 staining is pending to differentiate colorectal from breast origin. Treatment pending result of pathology staining.   Overall well and asymptomatic except for minor shortness of breath. Remains off apixaban at this time, but does not note any new swelling in her b/l lower extremities or worsening in her shortness of breath. Overall functional.  No toxic habits or family history of cancer.   2/12/25: Colonoscopy (Colonoscopy biopsy consistent with KAYLIN mod diff invasive adenocarcinoma at 15 cm from anal verge, mod diff) 2/14/25: EGD with pancreatic lesion biopsy (Consistent with low grade pancreatic NET) 2/21/25: Liver Lesion Biopsy: Metastatic adenocarcinoma, consistent with colorectal primary 3/3/25: C1D1 FOLFOX 3/17/25: C2D1 FOLFOX + panitumumab. Informed consent obtained for panitumumab  Sites: Hepatic, rectosigmoid Treatment: None (on anastrazole, now off, for ER+ breast cancer) NGS: [facility] (2/24/25) on primary tissue. KRAS/NRAS WT, APC splice site 835-*A>G, FBXW7, TP53. KAYLIN.  [de-identified] : 3/17/25: Here for C2 FOLFOX. Discussed addition of panitumumab. Tolerated cycle 1 well with minimal side effects, some loose stool, no constipation. Energy level and appetite stable. Haven't needed PRN medications for nausea. No peripheral neuropathy  3/31/25: Here for C3. Slight pre-meal nausea. Feeling a little bit of numbness/cold sensitivity in fingers.  Sores in mouth and cracked lips. Metallic taste on tongue. Not interfering with eating Watery eyes after treatment Dry eyes and itching eyes. Thursday after.  Eye doctor: not concerned. Saline solution.  Laying down burning sensation in chest. Heartburn.

## 2025-03-31 NOTE — ASSESSMENT
[Palliative] : Goals of care discussed with patient: Palliative [FreeTextEntry1] : Ms. Raymundo is an 83 year old woman with history of local ER+ breast cancer on endocrine therapy with recently diagnosed likely metastatic colorectal cancer; biopsy of the liver is pending to delineate metastatic colorectal from metastatic breast cancer. Today we discussed the natural history of her likely metastatic rectosigmoid cancer, the high probability that her hepatic lesions are related to her recently identified rectosigmoid mass, indications for treatment, and the hope that systemic therapy could lead to increased life expectancy and decreased symptoms. We discussed the palliative nature of therapy, that we could not eradicate this cancer and that systemic therapy is not a curative strategy. There are at least 3 metastatic hepatic lesions noted on PET, and likely more under the limit of detection; notably she is without active retroperitoneal lymph nodes; we are working to obtain her PET images for review. Given age, unlikely that DOUG will be an option.   For now, we discussed initiating systemic therapy with FOLFOX, and will add a monoclonal antibody following NGS results. C1 FOLFOX 3/3/25, tolerated well with minimal side effects  3/17/25: Discussed results of molecular testing. KAYLIN. KRAS/NRAS wildtype. Discussed addition of mab EGFR therapy with panitumumab to FOLFOX backbone.   3/31/25: Here for C3. Tolerating therapy well. No complaints.   # Metastatic Colorectal Cancer (KAYLIN, KRAS/NRAS WT): - Chemo teach and consent 2/24/2025. Panitumumab consent obtained 3/17 - FOLFOX C1 3/10/25, labs reviewed cleared for C3 FOLFOX+panitumumab - Moisturizer (cerave), doxycycline 100mg daily with food (rx sent) to prevent dermatologic toxicities that may occur - Plan for 3 to 6 months of treatment, then re-imaging to assess response to treatment  #History [place]: [place] [facility] [place] (reported 2/2 risk of blood clots)   #DVT: Completed 3 months of treatment. Given risk of thrombosis with malignancy, will provide prophylactic dosing of eliquis 2.5 mg BID. - Monitor for recurrent LE swelling or SoB - Return precautions/red flags discussed  #Chemotherapy Induced Nausea - Zofran prescribed; to start +D3 after chemo   Seen with fellow Dr. Pringle.

## 2025-03-31 NOTE — RESULTS/DATA
[FreeTextEntry1] : 2/10/25: CT AP- 1. Study protocol not optimized for detection of active gastrointestinal bleeding. Within this limitation, no hemorrhage is identified. 2. Several incidental findings, including: Suspected indeterminate 1.1 cm lesion at left kidney interpole. Pancreatic tail 3.5 cm unilocular cystic lesion. Diagnostic considerations include pancreatic pseudocyst and mucinous cystic neoplasm. Arterially enhancing 0.7 cm lesion in the left hepatic lobe. Recommend multiphase contrast-enhanced MR on nonemergent basis for further evaluation of these lesions.  2/12/25: Colonoscopy  2/12/25: MR Pelvis/Abdomen- *  0.7 cm flash filling hemangioma in the left lateral hepatic lobe corresponding to arterially enhancing focus on CT. *  Additional indeterminate hypovascular lesions in the right and left hepatic lobe are worrisome for metastases. *  3.3 cm annular soft tissue mass in the upper rectum suspicious for rectal neoplasm. No obstruction. *  3.9 x 3.6 cm unilocular thick-walled cystic lesion in the pancreatic tail with internal fluid level is suspicious for mucinous consistent neoplasm versus walled off pancreatic necrosis. Follow-up with GI for endoscopic evaluation. 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  2/13/25: CT Chest- 1.  Clear lungs. 2.  2.3 cm Inferior left thyroid gland nodule. Ultrasound recommended  2/14/25: Thyroid and Parathyroid US- Bilateral thyroid nodules. Consider follow-up ultrasound in one year. TI-RAD 3: Mildly suspicious (FNA if > 2.5 cm, Follow if > 1.5 cm)  2/14/25: EGD  2/14/25: Biopsy- Final Diagnosis 1.  Cecal polyps: -   Tubular adenoma(s) 2.  Ascending colon polyp: -   Tubular adenoma 3.  Transverse colon polyp: -   Tubular adenoma 4.  Rectal tumor, 15 cm from rectum: -   Invasive moderately differentiated colonic adenocarcinoma -   MMR immunohistochemistry shows intact proteins (MLH1, MSH2, MSH6 and PMS2)  2/14/25: Biopsy- Final Diagnosis 1. Fundus: -Gastric mucosa with foveolar hyperplasia  2/21/25: Liver Lesion Biopsy- pending  3/21/25: US Duplex Venous Lower Ext- Noncompressible RIGHT distal femoral vein and right popliteal vein Probably chronic DVT.

## 2025-04-14 NOTE — HISTORY OF PRESENT ILLNESS
[de-identified] : Dulce Maria Raymundo is an 83-year-old woman with history of ER+ breast cancer status post T+C and radiation in 2011 and now on anastrazole (with repeat lumpectomy and radiation 2024) presenting in the setting of newly diagnosed likely metastatic colorectal cancer with disease to the liver and complicated by DVT.   She originally presented to [facility] 2/11/2025 in the setting of rectal bleeding with significant drop in hemoglobin. There, she was found to have a colonoscopy with a high rectal tumor (mod diff, KAYLIN rectal adenocarcinoma). Advanced imaging identified multifocal hepatic metastases in addition to a pancreatic lesion (biopsy c/w low grade NET). Liver lesion biopsy confirmed to be adenocarcinoma, however GATA3 and CDX2 staining is pending to differentiate colorectal from breast origin. Treatment pending result of pathology staining.   Overall well and asymptomatic except for minor shortness of breath. Remains off apixaban at this time, but does not note any new swelling in her b/l lower extremities or worsening in her shortness of breath. Overall functional.  No toxic habits or family history of cancer.   2/12/25: Colonoscopy (Colonoscopy biopsy consistent with KAYLIN mod diff invasive adenocarcinoma at 15 cm from anal verge, mod diff) 2/14/25: EGD with pancreatic lesion biopsy (Consistent with low grade pancreatic NET) 2/21/25: Liver Lesion Biopsy: Metastatic adenocarcinoma, consistent with colorectal primary 3/3/25: C1D1 FOLFOX 3/17/25: C2D1 FOLFOX + panitumumab. Informed consent obtained for panitumumab  Sites: Hepatic, rectosigmoid Treatment: None (on anastrazole, now off, for ER+ breast cancer) NGS: [facility] (2/24/25) on primary tissue. KRAS/NRAS WT, APC splice site 835-*A>G, FBXW7, TP53. KAYLIN.  [de-identified] : 3/31/25: Here for C3 FOLFOX + Catalino. Slight pre-meal nausea. Feeling a little bit of numbness/cold sensitivity in fingers.  Sores in mouth and cracked lips. Metallic taste on tongue. Not interfering with eating Watery eyes after treatment Dry eyes and itching eyes. Thursday after.  Eye doctor: not concerned. Saline solution.  Laying down burning sensation in chest. Heartburn.   4/14/25: + red rash on face + dryness in nose/runny nose, chapped lips.  good appetite. Loose stool. Saw eye doctor no issue in eyes, slight burning and tearing, intermittent, saline solution,  Fingers, cold sensitivity. No neuropathy, not impacting ADLs

## 2025-04-14 NOTE — PHYSICAL EXAM
[Fully active, able to carry on all pre-disease performance without restriction] : Status 0 - Fully active, able to carry on all pre-disease performance without restriction [Normal] : affect appropriate [de-identified] : normal WoB, CTAB [de-identified] : RRR [de-identified] : right chest mediport, site clean without erythema

## 2025-04-14 NOTE — HISTORY OF PRESENT ILLNESS
[de-identified] : Dulce Maria Raymundo is an 83-year-old woman with history of ER+ breast cancer status post T+C and radiation in 2011 and now on anastrazole (with repeat lumpectomy and radiation 2024) presenting in the setting of newly diagnosed likely metastatic colorectal cancer with disease to the liver and complicated by DVT.   She originally presented to [facility] 2/11/2025 in the setting of rectal bleeding with significant drop in hemoglobin. There, she was found to have a colonoscopy with a high rectal tumor (mod diff, KAYLIN rectal adenocarcinoma). Advanced imaging identified multifocal hepatic metastases in addition to a pancreatic lesion (biopsy c/w low grade NET). Liver lesion biopsy confirmed to be adenocarcinoma, however GATA3 and CDX2 staining is pending to differentiate colorectal from breast origin. Treatment pending result of pathology staining.   Overall well and asymptomatic except for minor shortness of breath. Remains off apixaban at this time, but does not note any new swelling in her b/l lower extremities or worsening in her shortness of breath. Overall functional.  No toxic habits or family history of cancer.   2/12/25: Colonoscopy (Colonoscopy biopsy consistent with KAYLIN mod diff invasive adenocarcinoma at 15 cm from anal verge, mod diff) 2/14/25: EGD with pancreatic lesion biopsy (Consistent with low grade pancreatic NET) 2/21/25: Liver Lesion Biopsy: Metastatic adenocarcinoma, consistent with colorectal primary 3/3/25: C1D1 FOLFOX 3/17/25: C2D1 FOLFOX + panitumumab. Informed consent obtained for panitumumab  Sites: Hepatic, rectosigmoid Treatment: None (on anastrazole, now off, for ER+ breast cancer) NGS: [facility] (2/24/25) on primary tissue. KRAS/NRAS WT, APC splice site 835-*A>G, FBXW7, TP53. KAYLIN.  [de-identified] : 3/31/25: Here for C3 FOLFOX + Catalino. Slight pre-meal nausea. Feeling a little bit of numbness/cold sensitivity in fingers.  Sores in mouth and cracked lips. Metallic taste on tongue. Not interfering with eating Watery eyes after treatment Dry eyes and itching eyes. Thursday after.  Eye doctor: not concerned. Saline solution.  Laying down burning sensation in chest. Heartburn.   4/14/25: + red rash on face + dryness in nose/runny nose, chapped lips.  good appetite. Loose stool. Saw eye doctor no issue in eyes, slight burning and tearing, intermittent, saline solution,  Fingers, cold sensitivity. No neuropathy, not impacting ADLs

## 2025-04-14 NOTE — PHYSICAL EXAM
[Fully active, able to carry on all pre-disease performance without restriction] : Status 0 - Fully active, able to carry on all pre-disease performance without restriction [Normal] : affect appropriate [de-identified] : normal WoB, CTAB [de-identified] : RRR [de-identified] : right chest mediport, site clean without erythema

## 2025-04-14 NOTE — ASSESSMENT
[Palliative] : Goals of care discussed with patient: Palliative [FreeTextEntry1] : Ms. Raymundo is an 83 year old woman with history of local ER+ breast cancer on endocrine therapy with recently diagnosed likely metastatic colorectal cancer; biopsy of the liver is pending to delineate metastatic colorectal from metastatic breast cancer. Today we discussed the natural history of her likely metastatic rectosigmoid cancer, the high probability that her hepatic lesions are related to her recently identified rectosigmoid mass, indications for treatment, and the hope that systemic therapy could lead to increased life expectancy and decreased symptoms. We discussed the palliative nature of therapy, that we could not eradicate this cancer and that systemic therapy is not a curative strategy. There are at least 3 metastatic hepatic lesions noted on PET, and likely more under the limit of detection; notably she is without active retroperitoneal lymph nodes; we are working to obtain her PET images for review. Given age, unlikely that DOUG will be an option.   For now, we discussed initiating systemic therapy with FOLFOX, and will add a monoclonal antibody following NGS results. C1 FOLFOX 3/3/25, tolerated well with minimal side effects  3/17/25: Discussed results of molecular testing. KAYLIN. KRAS/NRAS wildtype. Discussed addition of mab EGFR therapy with panitumumab to FOLFOX backbone.   3/31/25: Here for C3. Tolerating therapy well. No complaints.   4/14/25: C4 FOLFOX + Catalino. Tolerating well, no rash.   # Metastatic Colorectal Cancer (KAYLIN, KRAS/NRAS WT): - Chemo teach and consent 2/24/2025. Panitumumab consent obtained 3/17 - FOLFOX C1 3/10/25 - > FOLFOX + Catalino C3+ - Moisturizer (cerave), doxycycline 100mg daily with food (rx sent) to prevent dermatologic toxicities that may occur - Plan for 3 to 6 months of treatment, then re-imaging to assess response to treatment  #History Breast Caner: - [place] [facility] [place] (reported 2/2 risk of blood clots)   #DVT: Completed 3 months of treatment. Given risk of thrombosis with malignancy, will provide prophylactic dosing of eliquis 2.5 mg BID. - Eliquis 2.5mg BID - Monitor for recurrent LE swelling or SoB - Return precautions/red flags discussed  #Chemotherapy Induced Nausea - Zofran prescribed; to start +D3 after chemo   Seen with fellow Dr. Pringle.

## 2025-04-14 NOTE — ASSESSMENT
[Palliative] : Goals of care discussed with patient: Palliative [FreeTextEntry1] : Ms. Raymundo is an 83 year old woman with history of local ER+ breast cancer on endocrine therapy with recently diagnosed likely metastatic colorectal cancer; biopsy of the liver is pending to delineate metastatic colorectal from metastatic breast cancer. Today we discussed the natural history of her likely metastatic rectosigmoid cancer, the high probability that her hepatic lesions are related to her recently identified rectosigmoid mass, indications for treatment, and the hope that systemic therapy could lead to increased life expectancy and decreased symptoms. We discussed the palliative nature of therapy, that we could not eradicate this cancer and that systemic therapy is not a curative strategy. There are at least 3 metastatic hepatic lesions noted on PET, and likely more under the limit of detection; notably she is without active retroperitoneal lymph nodes; we are working to obtain her PET images for review. Given age, unlikely that DOUG will be an option.   For now, we discussed initiating systemic therapy with FOLFOX, and will add a monoclonal antibody following NGS results. C1 FOLFOX 3/3/25, tolerated well with minimal side effects  3/17/25: Discussed results of molecular testing. KAYLIN. KRAS/NRAS wildtype. Discussed addition of mab EGFR therapy with panitumumab to FOLFOX backbone.   3/31/25: Here for C3. Tolerating therapy well. No complaints.   4/14/25: C4 FOLFOX + Catalino. Tolerating well, no rash.   # Metastatic Colorectal Cancer (KAYLIN, KRAS/NRAS WT): - Chemo teach and consent 2/24/2025. Panitumumab consent obtained 3/17 - FOLFOX C1 3/10/25 - > FOLFOX + Catalino C3+ - Moisturizer (cerave), doxycycline 100mg daily with food (rx sent) to prevent dermatologic toxicities that may occur - Plan for 3 to 6 months of treatment, then re-imaging to assess response to treatment  #History Breast Caner: - Had [facility] [place]. stopped anastrozole (reported 2/2 risk of blood clots)   #DVT: Completed 3 months of treatment. Given risk of thrombosis with malignancy, will provide prophylactic dosing of eliquis 2.5 mg BID. - Eliquis 2.5mg BID - Monitor for recurrent LE swelling or SoB - Return precautions/red flags discussed  #Chemotherapy Induced Nausea - Zofran prescribed; to start +D3 after chemo   Seen with fellow Dr. Pringle.

## 2025-04-28 NOTE — ASSESSMENT
[Palliative] : Goals of care discussed with patient: Palliative [FreeTextEntry1] : Ms. Raymundo is an 83 year old woman with history of local ER+ breast cancer on endocrine therapy with recently diagnosed likely metastatic colorectal cancer; biopsy of the liver is pending to delineate metastatic colorectal from metastatic breast cancer. Today we discussed the natural history of her likely metastatic rectosigmoid cancer, the high probability that her hepatic lesions are related to her recently identified rectosigmoid mass, indications for treatment, and the hope that systemic therapy could lead to increased life expectancy and decreased symptoms. We discussed the palliative nature of therapy, that we could not eradicate this cancer and that systemic therapy is not a curative strategy. There are at least 3 metastatic hepatic lesions noted on PET, and likely more under the limit of detection; notably she is without active retroperitoneal lymph nodes; we are working to obtain her PET images for review. Given age, unlikely that DOUG will be an option.   For now, we discussed initiating systemic therapy with FOLFOX, and will add a monoclonal antibody following NGS results. C1 FOLFOX 3/3/25, tolerated well with minimal side effects  3/17/25: Discussed results of molecular testing. KAYLIN. KRAS/NRAS wildtype. Discussed addition of mab EGFR therapy with panitumumab to FOLFOX backbone.   3/31/25: Here for C3. Tolerating therapy well. No complaints.   4/14/25: C4 FOLFOX + Catalino. Tolerating well, no rash.   4/28/25: C5 FOLFOX + Catalino. Due for imaging next. Minor neuropathy.   # Metastatic Colorectal Cancer (KAYLIN, KRAS/NRAS WT): - Chemo teach and consent 2/24/2025. Panitumumab consent obtained 3/17 - FOLFOX C1 3/10/25 - > FOLFOX + Catalino C3+ - Moisturizer (cerave), doxycycline 100mg daily with food (rx sent) to prevent dermatologic toxicities that may occur - Plan for 3 to 6 months of treatment, then re-imaging to assess response to treatment - CT CAP prior to next visit.   #History Breast Caner: - Had [facility] [place]. stopped anastrozole (reported 2/2 risk of blood clots)  #DVT: Completed 3 months of treatment. Given risk of thrombosis with malignancy, will provide prophylactic dosing of eliquis 2.5 mg BID. - Eliquis 2.5mg BID - Monitor for recurrent LE swelling or SoB - Return precautions/red flags discussed  #Chemotherapy Induced Nausea - Zofran prescribed; to start +D3 after chemo

## 2025-04-28 NOTE — PHYSICAL EXAM
[Fully active, able to carry on all pre-disease performance without restriction] : Status 0 - Fully active, able to carry on all pre-disease performance without restriction [Normal] : affect appropriate [de-identified] : normal WoB, CTAB [de-identified] : RRR [de-identified] : right chest mediport, site clean without erythema

## 2025-04-28 NOTE — RESULTS/DATA
Throughout shift patient and mother were very pleasant. A lot of education was completed regarding diet, discharge plans, and diabetes control. Patient is very committed to alcohol cessation and mother is supportive of this. Able to make his needs known and ambulates independently in the room. Patient and mother report to nursing staff intake and outputs.     Problem: Plan of Care - These are the overarching goals to be used throughout the patient stay.    Goal: Absence of Hospital-Acquired Illness or Injury  Outcome: Ongoing, Not Progressing  Intervention: Identify and Manage Fall Risk  Recent Flowsheet Documentation  Taken 4/11/2022 1600 by Kelsea Muñoz RN  Safety Promotion/Fall Prevention:   room near nurse's station   lighting adjusted  Taken 4/11/2022 1200 by Kelsea Muñoz RN  Safety Promotion/Fall Prevention:   room near nurse's station   lighting adjusted  Taken 4/11/2022 0800 by Kelsea Muñoz RN  Safety Promotion/Fall Prevention:   room near nurse's station   lighting adjusted  Intervention: Prevent Skin Injury  Recent Flowsheet Documentation  Taken 4/11/2022 1600 by Kelsea Muñoz RN  Body Position: position changed independently  Taken 4/11/2022 1200 by Kelsea Muñoz RN  Body Position: position changed independently  Taken 4/11/2022 0800 by Kelsea Muñoz RN  Body Position: position changed independently  Intervention: Prevent and Manage VTE (Venous Thromboembolism) Risk  Recent Flowsheet Documentation  Taken 4/11/2022 1600 by Kelsea Muñoz RN  VTE Prevention/Management: patient refused intervention  Activity Management: activity adjusted per tolerance  Taken 4/11/2022 1200 by Kelsea Muñoz RN  VTE Prevention/Management: patient refused intervention  Activity Management: activity adjusted per tolerance  Taken 4/11/2022 0800 by Kelsea Muñoz RN  VTE Prevention/Management: patient refused intervention  Activity Management: activity adjusted per tolerance  Intervention: Prevent  Infection  Recent Flowsheet Documentation  Taken 4/11/2022 1600 by Kelsea Muñoz RN  Infection Prevention: hand hygiene promoted  Taken 4/11/2022 1200 by Kelsea Muñoz RN  Infection Prevention: hand hygiene promoted  Taken 4/11/2022 0800 by Kelsea Muñoz RN  Infection Prevention: hand hygiene promoted  Goal: Optimal Comfort and Wellbeing  Outcome: Ongoing, Not Progressing  Intervention: Monitor Pain and Promote Comfort  Recent Flowsheet Documentation  Taken 4/11/2022 1600 by Kelsea Muñoz RN  Pain Management Interventions: medication offered but refused  Taken 4/11/2022 1200 by Kelsea Muñoz RN  Pain Management Interventions: medication offered but refused  Taken 4/11/2022 0800 by Kelsea Muñoz RN  Pain Management Interventions: medication offered but refused  Goal: Readiness for Transition of Care  Outcome: Ongoing, Not Progressing        [FreeTextEntry1] : 2/10/25: CT AP- 1. Study protocol not optimized for detection of active gastrointestinal bleeding. Within this limitation, no hemorrhage is identified. 2. Several incidental findings, including: Suspected indeterminate 1.1 cm lesion at left kidney interpole. Pancreatic tail 3.5 cm unilocular cystic lesion. Diagnostic considerations include pancreatic pseudocyst and mucinous cystic neoplasm. Arterially enhancing 0.7 cm lesion in the left hepatic lobe. Recommend multiphase contrast-enhanced MR on nonemergent basis for further evaluation of these lesions.  2/12/25: Colonoscopy  2/12/25: MR Pelvis/Abdomen- *  0.7 cm flash filling hemangioma in the left lateral hepatic lobe corresponding to arterially enhancing focus on CT. *  Additional indeterminate hypovascular lesions in the right and left hepatic lobe are worrisome for metastases. *  3.3 cm annular soft tissue mass in the upper rectum suspicious for rectal neoplasm. No obstruction. *  3.9 x 3.6 cm unilocular thick-walled cystic lesion in the pancreatic tail with internal fluid level is suspicious for mucinous consistent neoplasm versus walled off pancreatic necrosis. Follow-up with GI for endoscopic evaluation. 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  2/13/25: CT Chest- 1.  Clear lungs. 2.  2.3 cm Inferior left thyroid gland nodule. Ultrasound recommended  2/14/25: Thyroid and Parathyroid US- Bilateral thyroid nodules. Consider follow-up ultrasound in one year. TI-RAD 3: Mildly suspicious (FNA if > 2.5 cm, Follow if > 1.5 cm)  2/14/25: EGD  2/14/25: Biopsy- Final Diagnosis 1.  Cecal polyps: -   Tubular adenoma(s) 2.  Ascending colon polyp: -   Tubular adenoma 3.  Transverse colon polyp: -   Tubular adenoma 4.  Rectal tumor, 15 cm from rectum: -   Invasive moderately differentiated colonic adenocarcinoma -   MMR immunohistochemistry shows intact proteins (MLH1, MSH2, MSH6 and PMS2)  2/14/25: Biopsy- Final Diagnosis 1. Fundus: -Gastric mucosa with foveolar hyperplasia  2/21/25: Liver Lesion Biopsy- pending  3/21/25: US Duplex Venous Lower Ext- Noncompressible RIGHT distal femoral vein and right popliteal vein Probably chronic DVT.

## 2025-04-28 NOTE — HISTORY OF PRESENT ILLNESS
[de-identified] : Dulce Maria Raymundo is an 83-year-old woman with history of ER+ breast cancer status post T+C and radiation in 2011 and now on anastrazole (with repeat lumpectomy and radiation 2024) presenting in the setting of newly diagnosed likely metastatic colorectal cancer with disease to the liver and complicated by DVT.   She originally presented to [facility] 2/11/2025 in the setting of rectal bleeding with significant drop in hemoglobin. There, she was found to have a colonoscopy with a high rectal tumor (mod diff, KAYLIN rectal adenocarcinoma). Advanced imaging identified multifocal hepatic metastases in addition to a pancreatic lesion (biopsy c/w low grade NET). Liver lesion biopsy confirmed to be adenocarcinoma, however GATA3 and CDX2 staining is pending to differentiate colorectal from breast origin. Treatment pending result of pathology staining.   Overall well and asymptomatic except for minor shortness of breath. Remains off apixaban at this time, but does not note any new swelling in her b/l lower extremities or worsening in her shortness of breath. Overall functional.  No toxic habits or family history of cancer.   2/12/25: Colonoscopy (Colonoscopy biopsy consistent with KAYLIN mod diff invasive adenocarcinoma at 15 cm from anal verge, mod diff) 2/14/25: EGD with pancreatic lesion biopsy (Consistent with low grade pancreatic NET) 2/21/25: Liver Lesion Biopsy: Metastatic adenocarcinoma, consistent with colorectal primary 3/3/25: C1D1 FOLFOX 3/17/25-4/14/25: C2-C4 FOLFOX + panitumumab. Informed consent obtained for panitumumab  Sites: Hepatic, rectosigmoid Treatment: None (on anastrazole, now off, for ER+ breast cancer) NGS: [facility] (2/24/25) on primary tissue. KRAS/NRAS WT, APC splice site 835-*A>G, FBXW7, TP53. KAYLIN. [de-identified] : 3/31/25: Here for C3 FOLFOX + Catalino 4/14/25: C4 FOLFOX + Catalino  4/28/25: C5 FOLFOX + Catalino. Some neuropathy. No nausea or vomiting. Skin feels fine, minor rash/cheliosis. No rashes on hands or feet.

## 2025-04-28 NOTE — PHYSICAL EXAM
[Fully active, able to carry on all pre-disease performance without restriction] : Status 0 - Fully active, able to carry on all pre-disease performance without restriction [Normal] : affect appropriate [de-identified] : normal WoB, CTAB [de-identified] : RRR [de-identified] : right chest mediport, site clean without erythema

## 2025-04-28 NOTE — HISTORY OF PRESENT ILLNESS
[de-identified] : Dulce Maria Raymundo is an 83-year-old woman with history of ER+ breast cancer status post T+C and radiation in 2011 and now on anastrazole (with repeat lumpectomy and radiation 2024) presenting in the setting of newly diagnosed likely metastatic colorectal cancer with disease to the liver and complicated by DVT.   She originally presented to [facility] 2/11/2025 in the setting of rectal bleeding with significant drop in hemoglobin. There, she was found to have a colonoscopy with a high rectal tumor (mod diff, KAYLIN rectal adenocarcinoma). Advanced imaging identified multifocal hepatic metastases in addition to a pancreatic lesion (biopsy c/w low grade NET). Liver lesion biopsy confirmed to be adenocarcinoma, however GATA3 and CDX2 staining is pending to differentiate colorectal from breast origin. Treatment pending result of pathology staining.   Overall well and asymptomatic except for minor shortness of breath. Remains off apixaban at this time, but does not note any new swelling in her b/l lower extremities or worsening in her shortness of breath. Overall functional.  No toxic habits or family history of cancer.   2/12/25: Colonoscopy (Colonoscopy biopsy consistent with KAYLIN mod diff invasive adenocarcinoma at 15 cm from anal verge, mod diff) 2/14/25: EGD with pancreatic lesion biopsy (Consistent with low grade pancreatic NET) 2/21/25: Liver Lesion Biopsy: Metastatic adenocarcinoma, consistent with colorectal primary 3/3/25: C1D1 FOLFOX 3/17/25-4/14/25: C2-C4 FOLFOX + panitumumab. Informed consent obtained for panitumumab  Sites: Hepatic, rectosigmoid Treatment: None (on anastrazole, now off, for ER+ breast cancer) NGS: [facility] (2/24/25) on primary tissue. KRAS/NRAS WT, APC splice site 835-*A>G, FBXW7, TP53. KAYLIN. [de-identified] : 3/31/25: Here for C3 FOLFOX + Catalino 4/14/25: C4 FOLFOX + Catalino  4/28/25: C5 FOLFOX + Catalino. Some neuropathy. No nausea or vomiting. Skin feels fine, minor rash/cheliosis. No rashes on hands or feet.

## 2025-04-28 NOTE — ASSESSMENT
[Palliative] : Goals of care discussed with patient: Palliative [FreeTextEntry1] : Ms. Raymundo is an 83 year old woman with history of local ER+ breast cancer on endocrine therapy with recently diagnosed likely metastatic colorectal cancer; biopsy of the liver is pending to delineate metastatic colorectal from metastatic breast cancer. Today we discussed the natural history of her likely metastatic rectosigmoid cancer, the high probability that her hepatic lesions are related to her recently identified rectosigmoid mass, indications for treatment, and the hope that systemic therapy could lead to increased life expectancy and decreased symptoms. We discussed the palliative nature of therapy, that we could not eradicate this cancer and that systemic therapy is not a curative strategy. There are at least 3 metastatic hepatic lesions noted on PET, and likely more under the limit of detection; notably she is without active retroperitoneal lymph nodes; we are working to obtain her PET images for review. Given age, unlikely that DOUG will be an option.   For now, we discussed initiating systemic therapy with FOLFOX, and will add a monoclonal antibody following NGS results. C1 FOLFOX 3/3/25, tolerated well with minimal side effects  3/17/25: Discussed results of molecular testing. KAYLIN. KRAS/NRAS wildtype. Discussed addition of mab EGFR therapy with panitumumab to FOLFOX backbone.   3/31/25: Here for C3. Tolerating therapy well. No complaints.   4/14/25: C4 FOLFOX + Catalino. Tolerating well, no rash.   4/28/25: C5 FOLFOX + Catalino. Due for imaging next. Minor neuropathy.   # Metastatic Colorectal Cancer (KAYLIN, KRAS/NRAS WT): - Chemo teach and consent 2/24/2025. Panitumumab consent obtained 3/17 - FOLFOX C1 3/10/25 - > FOLFOX + Catalino C3+ - Moisturizer (cerave), doxycycline 100mg daily with food (rx sent) to prevent dermatologic toxicities that may occur - Plan for 3 to 6 months of treatment, then re-imaging to assess response to treatment - CT CAP prior to next visit.   #History [place]: - [place] [facility] [place] (reported 2/2 risk of blood clots)  #DVT: Completed 3 months of treatment. Given risk of thrombosis with malignancy, will provide prophylactic dosing of eliquis 2.5 mg BID. - Eliquis 2.5mg BID - Monitor for recurrent LE swelling or SoB - Return precautions/red flags discussed  #Chemotherapy Induced Nausea - Zofran prescribed; to start +D3 after chemo

## 2025-05-12 NOTE — PHYSICAL EXAM
[Fully active, able to carry on all pre-disease performance without restriction] : Status 0 - Fully active, able to carry on all pre-disease performance without restriction [Normal] : affect appropriate [de-identified] : normal WoB, CTAB [de-identified] : RRR [de-identified] : right chest mediport, site clean without erythema

## 2025-05-12 NOTE — RESULTS/DATA
[FreeTextEntry1] : 2/10/25: CT AP- 1. Study protocol not optimized for detection of active gastrointestinal bleeding. Within this limitation, no hemorrhage is identified. 2. Several incidental findings, including: Suspected indeterminate 1.1 cm lesion at left kidney interpole. Pancreatic tail 3.5 cm unilocular cystic lesion. Diagnostic considerations include pancreatic pseudocyst and mucinous cystic neoplasm. Arterially enhancing 0.7 cm lesion in the left hepatic lobe. Recommend multiphase contrast-enhanced MR on nonemergent basis for further evaluation of these lesions.  2/12/25: Colonoscopy  2/12/25: MR Pelvis/Abdomen- *  0.7 cm flash filling hemangioma in the left lateral hepatic lobe corresponding to arterially enhancing focus on CT. *  Additional indeterminate hypovascular lesions in the right and left hepatic lobe are worrisome for metastases. *  3.3 cm annular soft tissue mass in the upper rectum suspicious for rectal neoplasm. No obstruction. *  3.9 x 3.6 cm unilocular thick-walled cystic lesion in the pancreatic tail with internal fluid level is suspicious for mucinous consistent neoplasm versus walled off pancreatic necrosis. Follow-up with GI for endoscopic evaluation. 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  2/13/25: CT Chest- 1.  Clear lungs. 2.  2.3 cm Inferior left thyroid gland nodule. Ultrasound recommended  2/14/25: Thyroid and Parathyroid US- Bilateral thyroid nodules. Consider follow-up ultrasound in one year. TI-RAD 3: Mildly suspicious (FNA if > 2.5 cm, Follow if > 1.5 cm)  2/14/25: EGD  2/14/25: Biopsy- Final Diagnosis 1.  Cecal polyps: -   Tubular adenoma(s) 2.  Ascending colon polyp: -   Tubular adenoma 3.  Transverse colon polyp: -   Tubular adenoma 4.  Rectal tumor, 15 cm from rectum: -   Invasive moderately differentiated colonic adenocarcinoma -   MMR immunohistochemistry shows intact proteins (MLH1, MSH2, MSH6 and PMS2)  2/14/25: Biopsy- Final Diagnosis 1. Fundus: -Gastric mucosa with foveolar hyperplasia  2/21/25: Liver Lesion Biopsy- pending  3/21/25: US Duplex Venous Lower Ext- Noncompressible RIGHT distal femoral vein and right popliteal vein Probably chronic DVT.  5/8/2025: 1.  Previous circumferential rectal sigmoid mass is not identified on the current exam.  2.  Bilobar hepatic metastases have decreased in size and conspicuity since 2/13/2025.  3.  No evidence of metastatic disease in the chest.  4.  Unchanged 4.1 cm thick walled cystic lesion in the pancreatic tail.

## 2025-05-12 NOTE — HISTORY OF PRESENT ILLNESS
[de-identified] : Dulce Maria Raymundo is an 83-year-old woman with history of ER+ breast cancer status post T+C and radiation in 2011 and now on anastrazole (with repeat lumpectomy and radiation 2024) presenting in the setting of newly diagnosed likely metastatic colorectal cancer with disease to the liver and complicated by DVT.   She originally presented to [facility] 2/11/2025 in the setting of rectal bleeding with significant drop in hemoglobin. There, she was found to have a colonoscopy with a high rectal tumor (mod diff, KAYLIN rectal adenocarcinoma). Advanced imaging identified multifocal hepatic metastases in addition to a pancreatic lesion (biopsy c/w low grade NET). Liver lesion biopsy confirmed to be adenocarcinoma, however GATA3 and CDX2 staining is pending to differentiate colorectal from breast origin. Treatment pending result of pathology staining.   Overall well and asymptomatic except for minor shortness of breath. Remains off apixaban at this time, but does not note any new swelling in her b/l lower extremities or worsening in her shortness of breath. Overall functional.  No toxic habits or family history of cancer.   2/12/25: Colonoscopy (Colonoscopy biopsy consistent with KAYLIN mod diff invasive adenocarcinoma at 15 cm from anal verge, mod diff) 2/14/25: EGD with pancreatic lesion biopsy (Consistent with low grade pancreatic NET) 2/21/25: Liver Lesion Biopsy: Metastatic adenocarcinoma, consistent with colorectal primary 3/3/25: C1D1 FOLFOX 3/17/25-4/14/25: C2-C4 FOLFOX + panitumumab. Informed consent obtained for panitumumab  Sites: Hepatic, rectosigmoid Treatment: None (on anastrazole, now off, for ER+ breast cancer) NGS: [facility] (2/24/25) on primary tissue. KRAS/NRAS WT, APC splice site 835-*A>G, FBXW7, TP53. KAYLIN. [de-identified] : 3/31/25: Here for C3 FOLFOX + Catalino 4/14/25: C4 FOLFOX + Catalino  4/28/25: C5 FOLFOX + Catalino. Some neuropathy. No nausea or vomiting. Skin feels fine, minor rash/cheliosis. No rashes on hands or feet.  5/12/2025: C6 FOLFOX + Catalino. "Legs a little bit more tired". Scans with drastic reduction in disease burden.

## 2025-05-12 NOTE — ASSESSMENT
[Palliative] : Goals of care discussed with patient: Palliative [FreeTextEntry1] : Ms. Raymundo is an 83 year old woman with history of local ER+ breast cancer on endocrine therapy with recently diagnosed likely metastatic colorectal cancer; biopsy of the liver is pending to delineate metastatic colorectal from metastatic breast cancer. Today we discussed the natural history of her likely metastatic rectosigmoid cancer, the high probability that her hepatic lesions are related to her recently identified rectosigmoid mass, indications for treatment, and the hope that systemic therapy could lead to increased life expectancy and decreased symptoms. We discussed the palliative nature of therapy, that we could not eradicate this cancer and that systemic therapy is not a curative strategy. There are at least 3 metastatic hepatic lesions noted on PET, and likely more under the limit of detection; notably she is without active retroperitoneal lymph nodes; we are working to obtain her PET images for review. Given age, unlikely that DOUG will be an option.   For now, we discussed initiating systemic therapy with FOLFOX, and will add a monoclonal antibody following NGS results. C1 FOLFOX 3/3/25, tolerated well with minimal side effects  3/17/25: Discussed results of molecular testing. KAYLIN. KRAS/NRAS wildtype. Discussed addition of mab EGFR therapy with panitumumab to FOLFOX backbone.   3/31/25: Here for C3. Tolerating therapy well. No complaints.   4/14/25: C4 FOLFOX + Catalino. Tolerating well, no rash.   4/28/25: C5 FOLFOX + Catalino. Due for imaging next. Minor neuropathy.   5/12/25: C6 FOLFOX + Catalino. Excellent radiographic response. Discussed histotripsy for potential local control of hepatic disease.   # Metastatic Colorectal Cancer (KAYLIN, KRAS/NRAS WT): - Chemo teach and consent 2/24/2025. Panitumumab consent obtained 3/17 - FOLFOX C1 3/10/25 - > FOLFOX + Catalino C3 - Moisturizer (cerave), doxycycline 100mg daily with food (rx sent) to prevent dermatologic toxicities that may occur - Plan for 3 to 6 months of treatment, then re-imaging to assess response to treatment - CT CAP in 2-3 months from 5/12/25 - Consideration of local therapies and discussion of histotripsy   #History Breast Caner: - Had [facility] [place]. stopped anastrozole (reported 2/2 risk of blood clots)  #DVT: Completed 3 months of treatment. Given risk of thrombosis with malignancy, will provide prophylactic dosing of eliquis 2.5 mg BID. - Eliquis 2.5mg BID - Monitor for recurrent LE swelling or SoB - Return precautions/red flags discussed  #Chemotherapy Induced Nausea - Zofran prescribed; to start +D3 after chemo

## 2025-05-27 NOTE — PHYSICAL EXAM
[Fully active, able to carry on all pre-disease performance without restriction] : Status 0 - Fully active, able to carry on all pre-disease performance without restriction [Normal] : affect appropriate [de-identified] : normal WoB, CTAB [de-identified] : RRR [de-identified] : right chest mediport, site clean without erythema

## 2025-05-27 NOTE — ASSESSMENT
[Palliative] : Goals of care discussed with patient: Palliative [FreeTextEntry1] : Ms. Raymundo is an 83 year old woman with history of local ER+ breast cancer on endocrine therapy with recently diagnosed likely metastatic colorectal cancer; biopsy of the liver is pending to delineate metastatic colorectal from metastatic breast cancer. Today we discussed the natural history of her likely metastatic rectosigmoid cancer, the high probability that her hepatic lesions are related to her recently identified rectosigmoid mass, indications for treatment, and the hope that systemic therapy could lead to increased life expectancy and decreased symptoms. We discussed the palliative nature of therapy, that we could not eradicate this cancer and that systemic therapy is not a curative strategy. There are at least 3 metastatic hepatic lesions noted on PET, and likely more under the limit of detection; notably she is without active retroperitoneal lymph nodes; we are working to obtain her PET images for review. Given age, unlikely that DOUG will be an option.   For now, we discussed initiating systemic therapy with FOLFOX, and will add a monoclonal antibody following NGS results. C1 FOLFOX 3/3/25, tolerated well with minimal side effects  3/17/25: Discussed results of molecular testing. KAYLIN. KRAS/NRAS wildtype. Discussed addition of mab EGFR therapy with panitumumab to FOLFOX backbone.   3/31/25: Here for C3. Tolerating therapy well. No complaints.   4/14/25: C4 FOLFOX + Catalino. Tolerating well, no rash.   4/28/25: C5 FOLFOX + Catalino. Due for imaging next. Minor neuropathy.   5/12/25: C6 FOLFOX + Catalino. Excellent radiographic response. Discussed histotripsy for potential local control of hepatic disease.   5/27/25: C7 FOLFOX + Catalino. No neuropathy. Overall well. Seeing Allie to discuss histrotripsy.   # Metastatic Colorectal Cancer (KAYLIN, KRAS/NRAS WT): - Chemo teach and consent 2/24/2025. Panitumumab consent obtained 3/17 - FOLFOX C1 3/10/25 - > FOLFOX + Catalino C3 - Moisturizer (cerave), doxycycline 100mg daily with food (rx sent) to prevent dermatologic toxicities that may occur - Plan for 3 to 6 months of treatment, then re-imaging to assess response to treatment - CT CAP in 2-3 months from 5/12/25 - Consideration of local therapies and discussion of histotripsy (query RT to primary lesion?)  #History [place]: - [place] [facility] [place] (reported 2/2 risk of blood clots) - [place] [facility] [place]  #DVT: Completed 3 months of treatment. Given risk of thrombosis with malignancy, will provide prophylactic dosing of eliquis 2.5 mg BID. - Eliquis 2.5mg BID - Monitor for recurrent LE swelling or SoB - Return precautions/red flags discussed  #Chemotherapy Induced Nausea - Zofran prescribed; to start +D3 after chemo   Seen with fellow Dr. Solomon

## 2025-05-27 NOTE — HISTORY OF PRESENT ILLNESS
[de-identified] : Dulce Maria Raymundo is an 83-year-old woman with history of ER+ breast cancer status post T+C and radiation in 2011 and now on anastrazole (with repeat lumpectomy and radiation 2024) presenting in the setting of newly diagnosed likely metastatic colorectal cancer with disease to the liver and complicated by DVT.   She originally presented to [facility] 2/11/2025 in the setting of rectal bleeding with significant drop in hemoglobin. There, she was found to have a colonoscopy with a high rectal tumor (mod diff, KAYLIN rectal adenocarcinoma). Advanced imaging identified multifocal hepatic metastases in addition to a pancreatic lesion (biopsy c/w low grade NET). Liver lesion biopsy confirmed to be adenocarcinoma, however GATA3 and CDX2 staining is pending to differentiate colorectal from breast origin. Treatment pending result of pathology staining.   Overall well and asymptomatic except for minor shortness of breath. Remains off apixaban at this time, but does not note any new swelling in her b/l lower extremities or worsening in her shortness of breath. Overall functional.  No toxic habits or family history of cancer.   2/12/25: Colonoscopy (Colonoscopy biopsy consistent with KAYLIN mod diff invasive adenocarcinoma at 15 cm from anal verge, mod diff) 2/14/25: EGD with pancreatic lesion biopsy (Consistent with low grade pancreatic NET) 2/21/25: Liver Lesion Biopsy: Metastatic adenocarcinoma, consistent with colorectal primary 3/3/25: C1D1 FOLFOX 3/17/25-4/14/25: C2-C4 FOLFOX + panitumumab. Informed consent obtained for panitumumab  Sites: Hepatic, rectosigmoid Treatment: None (on anastrazole, now off, for ER+ breast cancer) NGS: [facility] (2/24/25) on primary tissue. KRAS/NRAS WT, APC splice site 835-*A>G, FBXW7, TP53. KAYLIN. [de-identified] : 3/31/25: Here for C3 FOLFOX + Catalino 4/14/25: C4 FOLFOX + Catalino  4/28/25: C5 FOLFOX + Catalino. Some neuropathy. No nausea or vomiting. Skin feels fine, minor rash/cheliosis. No rashes on hands or feet.  5/12/2025: C6 FOLFOX + Catalino. "Legs a little bit more tired". Scans with drastic reduction in disease burden.  5/27/25 C7 FOLFOX + Catalino. Nausea slightly worse than before and responsive to medications, constipation x 1 day. No significant neuropathy.  currently ill with URI.

## 2025-05-27 NOTE — HISTORY OF PRESENT ILLNESS
[de-identified] : Dulce Maria Raymundo is an 83-year-old woman with history of ER+ breast cancer status post T+C and radiation in 2011 and now on anastrazole (with repeat lumpectomy and radiation 2024) presenting in the setting of newly diagnosed likely metastatic colorectal cancer with disease to the liver and complicated by DVT.   She originally presented to [facility] 2/11/2025 in the setting of rectal bleeding with significant drop in hemoglobin. There, she was found to have a colonoscopy with a high rectal tumor (mod diff, KAYLIN rectal adenocarcinoma). Advanced imaging identified multifocal hepatic metastases in addition to a pancreatic lesion (biopsy c/w low grade NET). Liver lesion biopsy confirmed to be adenocarcinoma, however GATA3 and CDX2 staining is pending to differentiate colorectal from breast origin. Treatment pending result of pathology staining.   Overall well and asymptomatic except for minor shortness of breath. Remains off apixaban at this time, but does not note any new swelling in her b/l lower extremities or worsening in her shortness of breath. Overall functional.  No toxic habits or family history of cancer.   2/12/25: Colonoscopy (Colonoscopy biopsy consistent with KAYLIN mod diff invasive adenocarcinoma at 15 cm from anal verge, mod diff) 2/14/25: EGD with pancreatic lesion biopsy (Consistent with low grade pancreatic NET) 2/21/25: Liver Lesion Biopsy: Metastatic adenocarcinoma, consistent with colorectal primary 3/3/25: C1D1 FOLFOX 3/17/25-4/14/25: C2-C4 FOLFOX + panitumumab. Informed consent obtained for panitumumab  Sites: Hepatic, rectosigmoid Treatment: None (on anastrazole, now off, for ER+ breast cancer) NGS: [facility] (2/24/25) on primary tissue. KRAS/NRAS WT, APC splice site 835-*A>G, FBXW7, TP53. KAYLIN. [de-identified] : 3/31/25: Here for C3 FOLFOX + Catalino 4/14/25: C4 FOLFOX + Catalino  4/28/25: C5 FOLFOX + Catalino. Some neuropathy. No nausea or vomiting. Skin feels fine, minor rash/cheliosis. No rashes on hands or feet.  5/12/2025: C6 FOLFOX + Catalino. "Legs a little bit more tired". Scans with drastic reduction in disease burden.  5/27/25 C7 FOLFOX + Catalino. Nausea slightly worse than before and responsive to medications, constipation x 1 day. No significant neuropathy.  currently ill with URI.

## 2025-05-27 NOTE — ASSESSMENT
[Palliative] : Goals of care discussed with patient: Palliative [FreeTextEntry1] : Ms. Raymundo is an 83 year old woman with history of local ER+ breast cancer on endocrine therapy with recently diagnosed likely metastatic colorectal cancer; biopsy of the liver is pending to delineate metastatic colorectal from metastatic breast cancer. Today we discussed the natural history of her likely metastatic rectosigmoid cancer, the high probability that her hepatic lesions are related to her recently identified rectosigmoid mass, indications for treatment, and the hope that systemic therapy could lead to increased life expectancy and decreased symptoms. We discussed the palliative nature of therapy, that we could not eradicate this cancer and that systemic therapy is not a curative strategy. There are at least 3 metastatic hepatic lesions noted on PET, and likely more under the limit of detection; notably she is without active retroperitoneal lymph nodes; we are working to obtain her PET images for review. Given age, unlikely that DOUG will be an option.   For now, we discussed initiating systemic therapy with FOLFOX, and will add a monoclonal antibody following NGS results. C1 FOLFOX 3/3/25, tolerated well with minimal side effects  3/17/25: Discussed results of molecular testing. KAYLIN. KRAS/NRAS wildtype. Discussed addition of mab EGFR therapy with panitumumab to FOLFOX backbone.   3/31/25: Here for C3. Tolerating therapy well. No complaints.   4/14/25: C4 FOLFOX + Catalino. Tolerating well, no rash.   4/28/25: C5 FOLFOX + Catalino. Due for imaging next. Minor neuropathy.   5/12/25: C6 FOLFOX + Catalino. Excellent radiographic response. Discussed histotripsy for potential local control of hepatic disease.   5/27/25: C7 FOLFOX + Catalino. No neuropathy. Overall well. Seeing Allie to discuss histrotripsy.   # Metastatic Colorectal Cancer (KAYLIN, KRAS/NRAS WT): - Chemo teach and consent 2/24/2025. Panitumumab consent obtained 3/17 - FOLFOX C1 3/10/25 - > FOLFOX + Catalino C3 - Moisturizer (cerave), doxycycline 100mg daily with food (rx sent) to prevent dermatologic toxicities that may occur - Plan for 3 to 6 months of treatment, then re-imaging to assess response to treatment - CT CAP in 2-3 months from 5/12/25 - Consideration of local therapies and discussion of histotripsy (query RT to primary lesion?)  #History Breast Cancer: - [place] [facility] stopped an[place] (reported 2/2 risk of blood clots) - currently getting mammogram at [facility] [place]  #DVT: Completed 3 months of treatment. Given risk of thrombosis with malignancy, will provide prophylactic dosing of eliquis 2.5 mg BID. - Eliquis 2.5mg BID - Monitor for recurrent LE swelling or SoB - Return precautions/red flags discussed  #Chemotherapy Induced Nausea - Zofran prescribed; to start +D3 after chemo   Seen with fellow Dr. Solomon

## 2025-06-08 NOTE — END OF VISIT
[FreeTextEntry3] :  By signing my name below, I, Kimberly Duran, attest that this documentation has been prepared under the direction and in the presence of Kimberly Kelley MD in the following sections HISTORY OF PRESENT ILLNESS; PAST MEDICAL/FAMILY/SOCIAL HISTORY; REVIEW OF SYSTEMS; PHYSICAL EXAM; ASSESSMENT/PLAN.

## 2025-06-08 NOTE — HISTORY OF PRESENT ILLNESS
[de-identified] : Mrs CANNON is a 83 year old female being seen for a follow-up visit. Patient accompanied by spouse.   PMH:Breast neoplasm, History of Bright red blood per rectum, Deep vein thrombosis (DVT) of other vein of lower extremity, History of basal cell carcinoma, History of deep venous thrombosis, History of malignant melanoma, History of malignant neoplasm of breast, Hyperkalemia, Hyperlipidemia, unspecified hyperlipidemia type.  PSHX: History of Breast Surgery Lumpectomy, History of Total Abdominal Hysterectomy  MEDS:Anastrozole, Caltrate, Doxycycline Hyclate, Eliquis, High Absorption Magnesium, Lidocaine-Prilocaine, Ondansetron, Nausea, Prolia.   FMHX: Family history of acute myocardial infarction (Father),  lung cancer (Brother), breast cancer (Mother) Social: , Never a smoker, No illicit drug use, Retired from employment, and Social alcohol use  She originally presented to [facility] 2/11/2025 in the setting of rectal bleeding with significant drop in hemoglobin. There, she was found to have a colonoscopy with a high rectal tumor (mod diff, KAYLIN rectal adenocarcinoma). Advanced imaging identified multifocal hepatic metastases in addition to a pancreatic lesion (biopsy c/w low grade NET). Liver lesion biopsy confirmed to be adenocarcinoma.   Colonoscopy biopsy 2/12/25 showed consistent with KAYLIN mod diff invasive adenocarcinoma at 15 cm from anal verge, mod diff)  EGD with pancreatic lesion biopsy 2/14/25 showeed consistent with low grade pancreatic NET  Liver Lesion Biopsy 2/21/25 showed metastatic adenocarcinoma, consistent with colorectal primary   3/3/25: C1D1 FOLFOX 3/17/25-4/14/25: C2-C4 FOLFOX + panitumumab. Informed consent obtained for panitumumab 3/31/25:  C3 FOLFOX + Catalino 4/14/25: C4 FOLFOX + Catalino 4/28/25: C5 FOLFOX + Catalino. Some neuropathy. No nausea or vomiting. Skin feels fine, minor rash/cheliosis 5/12/2025: C6 FOLFOX + Catalino. "Legs a little bit more tired". Scans with drastic reduction in disease burden. 5/27/25 C7 FOLFOX + Catalino. Nausea slightly worse than before and responsive to medications, constipation x 1 day. No significant neuropathy.  currently ill with URI. 5/13 CEA- 2.3  6/5/25: She feels overall well and has not had any significant issues with her chemotherapy; very functional for her age and still active and does her own ADL and no CP or SOB with activity

## 2025-06-08 NOTE — PHYSICAL EXAM
[FreeTextEntry1] :  General: No acute distress. Well nourished and well kept. Head: AT/NC Eyes: PERRL. EOMI. Pulmonary: No respiratory distress. Abdomen: Soft. NT/ND. No rebound, no guarding, no rigidity. No peritoneal signs. No masses. Neurological: A&O x 4. Psychiatric: Normal affect and mood.

## 2025-06-08 NOTE — ASSESSMENT
[FreeTextEntry1] : Ms. Raymundo is an 83-year-old woman with a history of localized ER-positive breast cancer, currently on endocrine therapy, and a recent diagnosis of likely metastatic colorectal cancer with liver mets.  During consultation, we discussed management of metastatic rectosigmoid cancer and liver mets and that surgery with ablation would be a curative intent. Given her age, another option is to continue with chemotherapy for her primary lesion which is asymptomatic and address her liver disease with locoregional ttherapies (non-invasive) including histotripsy.   I discussed with resection and ablation would give her the oncologic outcomes but that it would be a longer recovery with some morbidity. On the other hand, histotripsy would be non-invasive with minimal risk and give her good disease control in the liver.  She expressed that she is not interested in surgery if that means she would require a stoma, which I reassured her would not be required given the location of her tumor.  She is still undecided regarding surgery and wants to hear more about all her options.   PLAN: Talk to Dr. Hunter and Yumiko regarding operative plan and her overall goals of care  Folllow up with her next week to make a definitive decision  She will think of the options discussed with her today   Kimberly Kelley MD

## 2025-06-08 NOTE — HISTORY OF PRESENT ILLNESS
[de-identified] : Mrs CANNON is a 83 year old female being seen for a follow-up visit. Patient accompanied by spouse.   PMH:Breast neoplasm, History of Bright red blood per rectum, Deep vein thrombosis (DVT) of other vein of lower extremity, History of basal cell carcinoma, History of deep venous thrombosis, History of malignant melanoma, History of malignant neoplasm of breast, Hyperkalemia, Hyperlipidemia, unspecified hyperlipidemia type.  PSHX: History of Breast Surgery Lumpectomy, History of Total Abdominal Hysterectomy  MEDS:Anastrozole, Caltrate, Doxycycline Hyclate, Eliquis, High Absorption Magnesium, Lidocaine-Prilocaine, Ondansetron, Nausea, Prolia.   FMHX: Family history of acute myocardial infarction (Father),  lung cancer (Brother), breast cancer (Mother) Social: , Never a smoker, No illicit drug use, Retired from employment, and Social alcohol use  She originally presented to [facility] 2/11/2025 in the setting of rectal bleeding with significant drop in hemoglobin. There, she was found to have a colonoscopy with a high rectal tumor (mod diff, KAYLIN rectal adenocarcinoma). Advanced imaging identified multifocal hepatic metastases in addition to a pancreatic lesion (biopsy c/w low grade NET). Liver lesion biopsy confirmed to be adenocarcinoma.   Colonoscopy biopsy 2/12/25 showed consistent with KAYLIN mod diff invasive adenocarcinoma at 15 cm from anal verge, mod diff)  EGD with pancreatic lesion biopsy 2/14/25 showeed consistent with low grade pancreatic NET  Liver Lesion Biopsy 2/21/25 showed metastatic adenocarcinoma, consistent with colorectal primary   3/3/25: C1D1 FOLFOX 3/17/25-4/14/25: C2-C4 FOLFOX + panitumumab. Informed consent obtained for panitumumab 3/31/25:  C3 FOLFOX + Catalino 4/14/25: C4 FOLFOX + Catalino 4/28/25: C5 FOLFOX + Catalino. Some neuropathy. No nausea or vomiting. Skin feels fine, minor rash/cheliosis 5/12/2025: C6 FOLFOX + Catalino. "Legs a little bit more tired". Scans with drastic reduction in disease burden. 5/27/25 C7 FOLFOX + Catalino. Nausea slightly worse than before and responsive to medications, constipation x 1 day. No significant neuropathy.  currently ill with URI. 5/13 CEA- 2.3  6/5/25: She feels overall well and has not had any significant issues with her chemotherapy; very functional for her age and still active and does her own ADL and no CP or SOB with activity

## 2025-06-09 NOTE — HISTORY OF PRESENT ILLNESS
[de-identified] : Dulce Maria Raymundo is an 83-year-old woman with history of ER+ breast cancer status post T+C and radiation in 2011 and now on anastrazole (with repeat lumpectomy and radiation 2024) presenting in the setting of newly diagnosed likely metastatic colorectal cancer with disease to the liver and complicated by DVT.   She originally presented to [facility] 2/11/2025 in the setting of rectal bleeding with significant drop in hemoglobin. There, she was found to have a colonoscopy with a high rectal tumor (mod diff, KAYLIN rectal adenocarcinoma). Advanced imaging identified multifocal hepatic metastases in addition to a pancreatic lesion (biopsy c/w low grade NET). Liver lesion biopsy confirmed to be adenocarcinoma, however GATA3 and CDX2 staining is pending to differentiate colorectal from breast origin. Treatment pending result of pathology staining.   Overall well and asymptomatic except for minor shortness of breath. Remains off apixaban at this time, but does not note any new swelling in her b/l lower extremities or worsening in her shortness of breath. Overall functional.  No toxic habits or family history of cancer.   2/12/25: Colonoscopy (Colonoscopy biopsy consistent with KAYLIN mod diff invasive adenocarcinoma at 15 cm from anal verge, mod diff) 2/14/25: EGD with pancreatic lesion biopsy (Consistent with low grade pancreatic NET) 2/21/25: Liver Lesion Biopsy: Metastatic adenocarcinoma, consistent with colorectal primary 3/3/25: C1D1 FOLFOX 3/17/25-4/14/25: C2-C4 FOLFOX + panitumumab. Informed consent obtained for panitumumab  Sites: Hepatic, rectosigmoid Treatment: None (on anastrazole, now off, for ER+ breast cancer) NGS: [facility] (2/24/25) on primary tissue. KRAS/NRAS WT, APC splice site 835-*A>G, FBXW7, TP53. KAYLIN. [de-identified] : 3/31/25: Here for C3 FOLFOX + Catalino 4/14/25: C4 FOLFOX + Catalino  4/28/25: C5 FOLFOX + Catalino. Some neuropathy. No nausea or vomiting. Skin feels fine, minor rash/cheliosis. No rashes on hands or feet.  5/12/2025: C6 FOLFOX + Catalino. "Legs a little bit more tired". Scans with drastic reduction in disease burden.  5/27/25 C7 FOLFOX + Catalino. Nausea slightly worse than before and responsive to medications, constipation x 1 day. No significant neuropathy.  currently ill with URI.  6/9/25: C8 FOLFOX + Catalino planned. Met with Allie. Discussed curative intent surgical resection with combined liver and primary tumor resection. Originally sent for discussion of histotripsy.

## 2025-06-09 NOTE — ASSESSMENT
[Palliative] : Goals of care discussed with patient: Palliative [FreeTextEntry1] : Ms. Raymundo is an 83 year old woman with history of local ER+ breast cancer on endocrine therapy with recently diagnosed likely metastatic colorectal cancer; biopsy of the liver is pending to delineate metastatic colorectal from metastatic breast cancer. Today we discussed the natural history of her likely metastatic rectosigmoid cancer, the high probability that her hepatic lesions are related to her recently identified rectosigmoid mass, indications for treatment, and the hope that systemic therapy could lead to increased life expectancy and decreased symptoms. We discussed the palliative nature of therapy, that we could not eradicate this cancer and that systemic therapy is not a curative strategy. There are at least 3 metastatic hepatic lesions noted on PET, and likely more under the limit of detection; notably she is without active retroperitoneal lymph nodes; we are working to obtain her PET images for review. Given age, unlikely that DOUG will be an option.   For now, we discussed initiating systemic therapy with FOLFOX, and will add a monoclonal antibody following NGS results. C1 FOLFOX 3/3/25, tolerated well with minimal side effects  3/17/25: Discussed results of molecular testing. KAYLIN. KRAS/NRAS wildtype. Discussed addition of mab EGFR therapy with panitumumab to FOLFOX backbone.   3/31/25: Here for C3. Tolerating therapy well. No complaints.   4/14/25: C4 FOLFOX + Catalino. Tolerating well, no rash.   4/28/25: C5 FOLFOX + Catalino. Due for imaging next. Minor neuropathy.   5/12/25: C6 FOLFOX + Catalino. Excellent radiographic response. Discussed histotripsy for potential local control of hepatic disease.   5/27/25: C7 FOLFOX + Catalino. No neuropathy. Overall well. Seeing Allie to discuss histrotripsy.   6/9/25: Had prolonged discussion regarding risks/benefits of targeted approaches, including surgical resection. Will hold chemotherapy today so as to not further shrink lesions (to allow targeting intra-op or with histotripsy).   # Metastatic Colorectal Cancer (KAYLIN, KRAS/NRAS WT): - Chemo teach and consent 2/24/2025. Panitumumab consent obtained 3/17 - FOLFOX C1 3/10/25 - > FOLFOX + Catalino C3 - Moisturizer (cerave), doxycycline 100mg daily with food (rx sent) to prevent dermatologic toxicities that may occur - CT CAP in 2-3 months from 5/12/25 - Plan for colonoscopy (referring 6/9/25) - Ultrasound for histotripsy  - Consideration of local therapies and discussion of histotripsy vs surgical resection of primary and hepatic lesoins (query RT to primary lesion?)  #History [place] Cancer: - [place] [facility] [place] (reported 2/2 risk of blood clots) - currently getting mammogram at [facility] [place]  #DVT: Completed 3 months of treatment. Given risk of thrombosis with malignancy, will provide prophylactic dosing of eliquis 2.5 mg BID. - Eliquis 2.5mg BID - Monitor for recurrent LE swelling or SoB - Return precautions/red flags discussed  #Chemotherapy Induced Nausea - Zofran prescribed; to start +D3 after chemo

## 2025-06-09 NOTE — HISTORY OF PRESENT ILLNESS
[de-identified] : Dulce Maria Raymundo is an 83-year-old woman with history of ER+ breast cancer status post T+C and radiation in 2011 and now on anastrazole (with repeat lumpectomy and radiation 2024) presenting in the setting of newly diagnosed likely metastatic colorectal cancer with disease to the liver and complicated by DVT.   She originally presented to [facility] 2/11/2025 in the setting of rectal bleeding with significant drop in hemoglobin. There, she was found to have a colonoscopy with a high rectal tumor (mod diff, KAYLIN rectal adenocarcinoma). Advanced imaging identified multifocal hepatic metastases in addition to a pancreatic lesion (biopsy c/w low grade NET). Liver lesion biopsy confirmed to be adenocarcinoma, however GATA3 and CDX2 staining is pending to differentiate colorectal from breast origin. Treatment pending result of pathology staining.   Overall well and asymptomatic except for minor shortness of breath. Remains off apixaban at this time, but does not note any new swelling in her b/l lower extremities or worsening in her shortness of breath. Overall functional.  No toxic habits or family history of cancer.   2/12/25: Colonoscopy (Colonoscopy biopsy consistent with KAYLIN mod diff invasive adenocarcinoma at 15 cm from anal verge, mod diff) 2/14/25: EGD with pancreatic lesion biopsy (Consistent with low grade pancreatic NET) 2/21/25: Liver Lesion Biopsy: Metastatic adenocarcinoma, consistent with colorectal primary 3/3/25: C1D1 FOLFOX 3/17/25-4/14/25: C2-C4 FOLFOX + panitumumab. Informed consent obtained for panitumumab  Sites: Hepatic, rectosigmoid Treatment: None (on anastrazole, now off, for ER+ breast cancer) NGS: [facility] (2/24/25) on primary tissue. KRAS/NRAS WT, APC splice site 835-*A>G, FBXW7, TP53. KAYLIN. [de-identified] : 3/31/25: Here for C3 FOLFOX + Catalino 4/14/25: C4 FOLFOX + Catalino  4/28/25: C5 FOLFOX + Catalino. Some neuropathy. No nausea or vomiting. Skin feels fine, minor rash/cheliosis. No rashes on hands or feet.  5/12/2025: C6 FOLFOX + Catalino. "Legs a little bit more tired". Scans with drastic reduction in disease burden.  5/27/25 C7 FOLFOX + Catalino. Nausea slightly worse than before and responsive to medications, constipation x 1 day. No significant neuropathy.  currently ill with URI.  6/9/25: C8 FOLFOX + Catalino planned. Met with Allie. Discussed curative intent surgical resection with combined liver and primary tumor resection. Originally sent for discussion of histotripsy.

## 2025-06-09 NOTE — ASSESSMENT
[Palliative] : Goals of care discussed with patient: Palliative [FreeTextEntry1] : Ms. Raymundo is an 83 year old woman with history of local ER+ breast cancer on endocrine therapy with recently diagnosed likely metastatic colorectal cancer; biopsy of the liver is pending to delineate metastatic colorectal from metastatic breast cancer. Today we discussed the natural history of her likely metastatic rectosigmoid cancer, the high probability that her hepatic lesions are related to her recently identified rectosigmoid mass, indications for treatment, and the hope that systemic therapy could lead to increased life expectancy and decreased symptoms. We discussed the palliative nature of therapy, that we could not eradicate this cancer and that systemic therapy is not a curative strategy. There are at least 3 metastatic hepatic lesions noted on PET, and likely more under the limit of detection; notably she is without active retroperitoneal lymph nodes; we are working to obtain her PET images for review. Given age, unlikely that DOUG will be an option.   For now, we discussed initiating systemic therapy with FOLFOX, and will add a monoclonal antibody following NGS results. C1 FOLFOX 3/3/25, tolerated well with minimal side effects  3/17/25: Discussed results of molecular testing. KAYLIN. KRAS/NRAS wildtype. Discussed addition of mab EGFR therapy with panitumumab to FOLFOX backbone.   3/31/25: Here for C3. Tolerating therapy well. No complaints.   4/14/25: C4 FOLFOX + Catalino. Tolerating well, no rash.   4/28/25: C5 FOLFOX + Catalino. Due for imaging next. Minor neuropathy.   5/12/25: C6 FOLFOX + Catalino. Excellent radiographic response. Discussed histotripsy for potential local control of hepatic disease.   5/27/25: C7 FOLFOX + Catalino. No neuropathy. Overall well. Seeing Allie to discuss histrotripsy.   6/9/25: Had prolonged discussion regarding risks/benefits of targeted approaches, including surgical resection. Will hold chemotherapy today so as to not further shrink lesions (to allow targeting intra-op or with histotripsy).   # Metastatic Colorectal Cancer (KAYLIN, KRAS/NRAS WT): - Chemo teach and consent 2/24/2025. Panitumumab consent obtained 3/17 - FOLFOX C1 3/10/25 - > FOLFOX + Catalino C3 - Moisturizer (cerave), doxycycline 100mg daily with food (rx sent) to prevent dermatologic toxicities that may occur - CT CAP in 2-3 months from 5/12/25 - Plan for colonoscopy (referring 6/9/25) - Ultrasound for histotripsy  - Consideration of local therapies and discussion of histotripsy vs surgical resection of primary and hepatic lesoins (query RT to primary lesion?)  #History Breast Cancer: - Had [facility] [place]. stopped anastrozole (reported 2/2 risk of blood clots) - [place] [facility] [place]DVT: Completed 3 months of treatment. Given risk of thrombosis with malignancy, will provide prophylactic dosing of eliquis 2.5 mg BID. - Eliquis 2.5mg BID - Monitor for recurrent LE swelling or SoB - Return precautions/red flags discussed  #Chemotherapy Induced Nausea - Zofran prescribed; to start +D3 after chemo

## 2025-06-09 NOTE — PHYSICAL EXAM
[Fully active, able to carry on all pre-disease performance without restriction] : Status 0 - Fully active, able to carry on all pre-disease performance without restriction [Normal] : affect appropriate [de-identified] : normal WoB, CTAB [de-identified] : right chest mediport, site clean without erythema [de-identified] : RRR

## 2025-06-09 NOTE — PHYSICAL EXAM
[Fully active, able to carry on all pre-disease performance without restriction] : Status 0 - Fully active, able to carry on all pre-disease performance without restriction [Normal] : affect appropriate [de-identified] : normal WoB, CTAB [de-identified] : right chest mediport, site clean without erythema [de-identified] : RRR

## 2025-06-12 NOTE — HISTORY OF PRESENT ILLNESS
[de-identified] : Mrs CANNON is a 83 year old female being seen for a follow-up visit. Patient accompanied by spouse.   PMH:Breast neoplasm, History of Bright red blood per rectum, Deep vein thrombosis (DVT) of other vein of lower extremity, History of basal cell carcinoma, History of deep venous thrombosis, History of malignant melanoma, History of malignant neoplasm of breast, Hyperkalemia, Hyperlipidemia, unspecified hyperlipidemia type.  PSHX: History of Breast Surgery Lumpectomy, History of Total Abdominal Hysterectomy  MEDS:Anastrozole, Caltrate, Doxycycline Hyclate, Eliquis, High Absorption Magnesium, Lidocaine-Prilocaine, Ondansetron, Nausea, Prolia.   FMHX: Family history of acute myocardial infarction (Father),  lung cancer (Brother), breast cancer (Mother) Social: , Never a smoker, No illicit drug use, Retired from employment, and Social alcohol use  She originally presented to [facility] 2/11/2025 in the setting of rectal bleeding with significant drop in hemoglobin. There, she was found to have a colonoscopy with a high rectal tumor (mod diff, KAYLIN rectal adenocarcinoma). Advanced imaging identified multifocal hepatic metastases in addition to a pancreatic lesion (biopsy c/w low grade NET). Liver lesion biopsy confirmed to be adenocarcinoma.   Colonoscopy biopsy 2/12/25 showed consistent with KAYLIN mod diff invasive adenocarcinoma at 15 cm from anal verge, mod diff)  EGD with pancreatic lesion biopsy 2/14/25 showeed consistent with low grade pancreatic NET  Liver Lesion Biopsy 2/21/25 showed metastatic adenocarcinoma, consistent with colorectal primary   3/3/25: C1D1 FOLFOX 3/17/25-4/14/25: C2-C4 FOLFOX + panitumumab. Informed consent obtained for panitumumab 3/31/25:  C3 FOLFOX + Catalino 4/14/25: C4 FOLFOX + Catalino 4/28/25: C5 FOLFOX + Catalino. Some neuropathy. No nausea or vomiting. Skin feels fine, minor rash/cheliosis 5/12/2025: C6 FOLFOX + Catalino. "Legs a little bit more tired". Scans with drastic reduction in disease burden. 5/27/25 C7 FOLFOX + Catalino. Nausea slightly worse than before and responsive to medications, constipation x 1 day. No significant neuropathy.  currently ill with URI. 5/13 CEA- 2.3  6/5/25: She feels overall well and has not had any significant issues with her chemotherapy; very functional for her age and still active and does her own ADL and no CP or SOB with activity  6/9/25: C8 FOLFOX + Catalino planned. Met with Allie. Discussed curative intent surgical resection with combined liver and primary tumor resection. Originally sent for discussion of histotripsy. 6/9 CEA- 2.5 6/9 Phosphorus- 1.8  6/12/25: pt feels well overall,  expressed his concerns and questions which I answered to the best of my ability. pt says the plan to go on vacation in August

## 2025-06-12 NOTE — END OF VISIT
[Time Spent: ___ minutes] : I have spent [unfilled] minutes of time on the encounter which excludes teaching and separately reported services. [FreeTextEntry3] :  By signing my name below, I, Kimberly Duran, attest that this documentation has been prepared under the direction and in the presence of Kimberly Kelley MD in the following sections HISTORY OF PRESENT ILLNESS; PAST MEDICAL/FAMILY/SOCIAL HISTORY; REVIEW OF SYSTEMS; PHYSICAL EXAM; ASSESSMENT/PLAN.  There are no Wet Read(s) to document. [FreeTextEntry2] :  Kimberly Kelley MD Division of Surgical Oncology Director, [facility] Hepatic Artery Infusion Pump , Translational Research in Surgical Oncology

## 2025-06-12 NOTE — ASSESSMENT
[FreeTextEntry1] : Ms. Raymundo is an 83-year-old woman with a history of localized ER-positive breast cancer, currently on endocrine therapy, and a recent diagnosis of likely metastatic colorectal cancer with liver mets.  During consultation, we discussed management of metastatic rectosigmoid cancer and liver mets and that surgery with ablation would be a curative intent. Given her age, another option is to continue with chemotherapy for her primary lesion which is asymptomatic and address her liver disease with locoregional ttherapies (non-invasive) including histotripsy.   I discussed with resection and ablation would give her the oncologic outcomes but that it would be a longer recovery with some morbidity. On the other hand, histotripsy would be non-invasive with minimal risk and give her good disease control in the liver.  She expressed that she is not interested in surgery if that means she would require a stoma, which I reassured her would not be required given the location of her tumor.  She is still undecided regarding surgery and wants to hear more about all her options.   I told the pt that I want to Prevent a big incision to help the recovery processes. The goal is to do it robotically, start with a histo screen US to see what's visible and not visible. Then I can begin to plan how to go about her surgery.   PLAN:  Be off chemo for one month prior to surgery  Make sure she gets US screen in 1-2 weeks  if the pt is a candidate for histo, we plan to do the surgery in july before the pts trip  f/u with pt in 2 weeks     Kimberly Kelley MD Division of Surgical Oncology Director, [facility] Hepatic Artery Infusion Pump , Translational Research in Surgical Oncology    Today, I personally spent [30] minutes in total time including reviewing imaging and studies, discussing complex treatment regimens, direct face to face time with the patient, patient education and counseling.

## 2025-06-12 NOTE — ASSESSMENT
[FreeTextEntry1] : Ms. Raymunod is an 83-year-old woman with a history of localized ER-positive breast cancer, currently on endocrine therapy, and a recent diagnosis of likely metastatic colorectal cancer with liver mets.  During consultation, we discussed management of metastatic rectosigmoid cancer and liver mets and that surgery with ablation would be a curative intent. Given her age, another option is to continue with chemotherapy for her primary lesion which is asymptomatic and address her liver disease with locoregional ttherapies (non-invasive) including histotripsy.   I discussed with resection and ablation would give her the oncologic outcomes but that it would be a longer recovery with some morbidity. On the other hand, histotripsy would be non-invasive with minimal risk and give her good disease control in the liver.  She expressed that she is not interested in surgery if that means she would require a stoma, which I reassured her would not be required given the location of her tumor.  She is still undecided regarding surgery and wants to hear more about all her options.   I told the pt that I want to Prevent a big incision to help the recovery processes. The goal is to do it robotically, start with a histo screen US to see what's visible and not visible. Then I can begin to plan how to go about her surgery.   PLAN:  Be off chemo for one month prior to surgery  Make sure she gets US screen in 1-2 weeks  if the pt is a candidate for histo, we plan to do the surgery in july before the pts trip  f/u with pt in 2 weeks     Kimberly Kelley MD Division of Surgical Oncology Director, [facility] Hepatic Artery Infusion Pump , Translational Research in Surgical Oncology    Today, I personally spent [30] minutes in total time including reviewing imaging and studies, discussing complex treatment regimens, direct face to face time with the patient, patient education and counseling.

## 2025-06-12 NOTE — HISTORY OF PRESENT ILLNESS
[de-identified] : Mrs CANNON is a 83 year old female being seen for a follow-up visit. Patient accompanied by spouse.   PMH:Breast neoplasm, History of Bright red blood per rectum, Deep vein thrombosis (DVT) of other vein of lower extremity, History of basal cell carcinoma, History of deep venous thrombosis, History of malignant melanoma, History of malignant neoplasm of breast, Hyperkalemia, Hyperlipidemia, unspecified hyperlipidemia type.  PSHX: History of Breast Surgery Lumpectomy, History of Total Abdominal Hysterectomy  MEDS:Anastrozole, Caltrate, Doxycycline Hyclate, Eliquis, High Absorption Magnesium, Lidocaine-Prilocaine, Ondansetron, Nausea, Prolia.   FMHX: Family history of acute myocardial infarction (Father),  lung cancer (Brother), breast cancer (Mother) Social: , Never a smoker, No illicit drug use, Retired from employment, and Social alcohol use  She originally presented to [facility] 2/11/2025 in the setting of rectal bleeding with significant drop in hemoglobin. There, she was found to have a colonoscopy with a high rectal tumor (mod diff, KAYLIN rectal adenocarcinoma). Advanced imaging identified multifocal hepatic metastases in addition to a pancreatic lesion (biopsy c/w low grade NET). Liver lesion biopsy confirmed to be adenocarcinoma.   Colonoscopy biopsy 2/12/25 showed consistent with KAYLIN mod diff invasive adenocarcinoma at 15 cm from anal verge, mod diff)  EGD with pancreatic lesion biopsy 2/14/25 showeed consistent with low grade pancreatic NET  Liver Lesion Biopsy 2/21/25 showed metastatic adenocarcinoma, consistent with colorectal primary   3/3/25: C1D1 FOLFOX 3/17/25-4/14/25: C2-C4 FOLFOX + panitumumab. Informed consent obtained for panitumumab 3/31/25:  C3 FOLFOX + Catalino 4/14/25: C4 FOLFOX + Catalino 4/28/25: C5 FOLFOX + Catalino. Some neuropathy. No nausea or vomiting. Skin feels fine, minor rash/cheliosis 5/12/2025: C6 FOLFOX + Catalino. "Legs a little bit more tired". Scans with drastic reduction in disease burden. 5/27/25 C7 FOLFOX + Catalino. Nausea slightly worse than before and responsive to medications, constipation x 1 day. No significant neuropathy.  currently ill with URI. 5/13 CEA- 2.3  6/5/25: She feels overall well and has not had any significant issues with her chemotherapy; very functional for her age and still active and does her own ADL and no CP or SOB with activity  6/9/25: C8 FOLFOX + Catalino planned. Met with Allie. Discussed curative intent surgical resection with combined liver and primary tumor resection. Originally sent for discussion of histotripsy. 6/9 CEA- 2.5 6/9 Phosphorus- 1.8  6/12/25: pt feels well overall,  expressed his concerns and questions which I answered to the best of my ability. pt says the plan to go on vacation in August

## 2025-06-12 NOTE — HISTORY OF PRESENT ILLNESS
[de-identified] : Mrs CANNON is a 83 year old female being seen for a follow-up visit. Patient accompanied by spouse.   PMH:Breast neoplasm, History of Bright red blood per rectum, Deep vein thrombosis (DVT) of other vein of lower extremity, History of basal cell carcinoma, History of deep venous thrombosis, History of malignant melanoma, History of malignant neoplasm of breast, Hyperkalemia, Hyperlipidemia, unspecified hyperlipidemia type.  PSHX: History of Breast Surgery Lumpectomy, History of Total Abdominal Hysterectomy  MEDS:Anastrozole, Caltrate, Doxycycline Hyclate, Eliquis, High Absorption Magnesium, Lidocaine-Prilocaine, Ondansetron, Nausea, Prolia.   FMHX: Family history of acute myocardial infarction (Father),  lung cancer (Brother), breast cancer (Mother) Social: , Never a smoker, No illicit drug use, Retired from employment, and Social alcohol use  She originally presented to [facility] 2/11/2025 in the setting of rectal bleeding with significant drop in hemoglobin. There, she was found to have a colonoscopy with a high rectal tumor (mod diff, KAYLIN rectal adenocarcinoma). Advanced imaging identified multifocal hepatic metastases in addition to a pancreatic lesion (biopsy c/w low grade NET). Liver lesion biopsy confirmed to be adenocarcinoma.   Colonoscopy biopsy 2/12/25 showed consistent with KAYLIN mod diff invasive adenocarcinoma at 15 cm from anal verge, mod diff)  EGD with pancreatic lesion biopsy 2/14/25 showeed consistent with low grade pancreatic NET  Liver Lesion Biopsy 2/21/25 showed metastatic adenocarcinoma, consistent with colorectal primary   3/3/25: C1D1 FOLFOX 3/17/25-4/14/25: C2-C4 FOLFOX + panitumumab. Informed consent obtained for panitumumab 3/31/25:  C3 FOLFOX + Catalino 4/14/25: C4 FOLFOX + Catalino 4/28/25: C5 FOLFOX + Catalino. Some neuropathy. No nausea or vomiting. Skin feels fine, minor rash/cheliosis 5/12/2025: C6 FOLFOX + Catalino. "Legs a little bit more tired". Scans with drastic reduction in disease burden. 5/27/25 C7 FOLFOX + Catalino. Nausea slightly worse than before and responsive to medications, constipation x 1 day. No significant neuropathy.  currently ill with URI. 5/13 CEA- 2.3  6/5/25: She feels overall well and has not had any significant issues with her chemotherapy; very functional for her age and still active and does her own ADL and no CP or SOB with activity  6/9/25: C8 FOLFOX + Catalino planned. Met with Allie. Discussed curative intent surgical resection with combined liver and primary tumor resection. Originally sent for discussion of histotripsy. 6/9 CEA- 2.5 6/9 Phosphorus- 1.8  6/12/25: pt feels well overall,  expressed his concerns and questions which I answered to the best of my ability. pt says the plan to go on vacation in August

## 2025-06-12 NOTE — END OF VISIT
[Time Spent: ___ minutes] : I have spent [unfilled] minutes of time on the encounter which excludes teaching and separately reported services. [FreeTextEntry3] :  By signing my name below, I, Kimberly Duran, attest that this documentation has been prepared under the direction and in the presence of Kimberly Kelley MD in the following sections HISTORY OF PRESENT ILLNESS; PAST MEDICAL/FAMILY/SOCIAL HISTORY; REVIEW OF SYSTEMS; PHYSICAL EXAM; ASSESSMENT/PLAN.  [FreeTextEntry2] :  Kimberly Kelley MD Division of Surgical Oncology Director, [facility] Hepatic Artery Infusion Pump , Translational Research in Surgical Oncology

## 2025-06-23 NOTE — ASSESSMENT
[FreeTextEntry1] : Ms. Raymundo is an 83 year old woman with history of local ER+ breast cancer on endocrine therapy with recently diagnosed likely metastatic colorectal cancer; biopsy of the liver is pending to delineate metastatic colorectal from metastatic breast cancer. Today we discussed the natural history of her likely metastatic rectosigmoid cancer, the high probability that her hepatic lesions are related to her recently identified rectosigmoid mass, indications for treatment, and the hope that systemic therapy could lead to increased life expectancy and decreased symptoms. We discussed the palliative nature of therapy, that we could not eradicate this cancer and that systemic therapy is not a curative strategy. There are at least 3 metastatic hepatic lesions noted on PET, and likely more under the limit of detection; notably she is without active retroperitoneal lymph nodes; we are working to obtain her PET images for review. Given age, unlikely that DOUG will be an option.   For now, we discussed initiating systemic therapy with FOLFOX, and will add a monoclonal antibody following NGS results. C1 FOLFOX 3/3/25, tolerated well with minimal side effects  3/17/25: Discussed results of molecular testing. KAYLIN. KRAS/NRAS wildtype. Discussed addition of mab EGFR therapy with panitumumab to FOLFOX backbone.   3/31/25: Here for C3. Tolerating therapy well. No complaints.   4/14/25: C4 FOLFOX + Catalino. Tolerating well, no rash.   4/28/25: C5 FOLFOX + Catalino. Due for imaging next. Minor neuropathy.   5/12/25: C6 FOLFOX + Catalino. Excellent radiographic response. Discussed histotripsy for potential local control of hepatic disease.   5/27/25: C7 FOLFOX + Catalino. No neuropathy. Overall well. Seeing Allie to discuss histrotripsy.   6/9/25: Had prolonged discussion regarding risks/benefits of targeted approaches, including surgical resection. Will hold chemotherapy today so as to not further shrink lesions (to allow targeting intra-op or with histotripsy).   6/23/25: Continued conversation regarding surgery vs additional chemotherapy, went over colonoscopy results. Amazing response to chemotherapy. Alvin pending 6/9.   # Metastatic Colorectal Cancer (KAYLIN, KRAS/NRAS WT): Colonoscopy 6/2025 with extremely scant tumor. Following up with Allie for possible hepatic resection. U/S unable to visualize lesions.  - Chemo teach and consent 2/24/2025. Panitumumab consent obtained 3/17 - FOLFOX C1 3/10/25 - > FOLFOX + Catalino C3 - Moisturizer (cerave), doxycycline 100mg daily with food (rx sent) to prevent dermatologic toxicities that may occur - CT CAP in 2-3 months from 5/12/25  #History Breast Cancer: - Had [facility] [place] appointment. Stopped anastrozole (reported 2/2 risk of blood clots) - Currently getting mamm[place] [facility] [place]  #DVT: Completed 3 months of treatment. Given risk of thrombosis with malignancy, will provide prophylactic dosing of Eliquis 2.5 mg BID. - Eliquis 2.5mg BID - Monitor for recurrent LE swelling or SoB - Return precautions/red flags discussed  #Chemotherapy Induced Nausea - Zofran prescribed; to start +D3 after chemo

## 2025-06-23 NOTE — PHYSICAL EXAM
[de-identified] : normal WoB, CTAB [de-identified] : right chest mediport, site clean without erythema [de-identified] : RRR

## 2025-06-23 NOTE — ASSESSMENT
[FreeTextEntry1] : Ms. Raymundo is an 83 year old woman with history of local ER+ breast cancer on endocrine therapy with recently diagnosed likely metastatic colorectal cancer; biopsy of the liver is pending to delineate metastatic colorectal from metastatic breast cancer. Today we discussed the natural history of her likely metastatic rectosigmoid cancer, the high probability that her hepatic lesions are related to her recently identified rectosigmoid mass, indications for treatment, and the hope that systemic therapy could lead to increased life expectancy and decreased symptoms. We discussed the palliative nature of therapy, that we could not eradicate this cancer and that systemic therapy is not a curative strategy. There are at least 3 metastatic hepatic lesions noted on PET, and likely more under the limit of detection; notably she is without active retroperitoneal lymph nodes; we are working to obtain her PET images for review. Given age, unlikely that DOUG will be an option.   For now, we discussed initiating systemic therapy with FOLFOX, and will add a monoclonal antibody following NGS results. C1 FOLFOX 3/3/25, tolerated well with minimal side effects  3/17/25: Discussed results of molecular testing. KAYLIN. KRAS/NRAS wildtype. Discussed addition of mab EGFR therapy with panitumumab to FOLFOX backbone.   3/31/25: Here for C3. Tolerating therapy well. No complaints.   4/14/25: C4 FOLFOX + Catalino. Tolerating well, no rash.   4/28/25: C5 FOLFOX + Catalino. Due for imaging next. Minor neuropathy.   5/12/25: C6 FOLFOX + Catalino. Excellent radiographic response. Discussed histotripsy for potential local control of hepatic disease.   5/27/25: C7 FOLFOX + Catalino. No neuropathy. Overall well. Seeing Allie to discuss histrotripsy.   6/9/25: Had prolonged discussion regarding risks/benefits of targeted approaches, including surgical resection. Will hold chemotherapy today so as to not further shrink lesions (to allow targeting intra-op or with histotripsy).   6/23/25: Continued conversation regarding surgery vs additional chemotherapy, went over colonoscopy results. Amazing response to chemotherapy. Alvin pending 6/9.   # Metastatic Colorectal Cancer (KAYLIN, KRAS/NRAS WT): Colonoscopy 6/2025 with extremely scant tumor. Following up with Allie for possible hepatic resection. U/S unable to visualize lesions.  - Chemo teach and consent 2/24/2025. Panitumumab consent obtained 3/17 - FOLFOX C1 3/10/25 - > FOLFOX + Catalino C3 - Moisturizer (cerave), doxycycline 100mg daily with food (rx sent) to prevent dermatologic toxicities that may occur - CT CAP in 2-3 months from 5/12/25  #History Breast Cancer: - Had [facility] [place]. Stopped anastrozole (reported 2/2 risk of blood clots) - [place] [facility] [place]  #DVT: Completed 3 months of treatment. Given risk of thrombosis with malignancy, will provide prophylactic dosing of Eliquis 2.5 mg BID. - Eliquis 2.5mg BID - Monitor for recurrent LE swelling or SoB - Return precautions/red flags discussed  #Chemotherapy Induced Nausea - Zofran prescribed; to start +D3 after chemo

## 2025-06-23 NOTE — HISTORY OF PRESENT ILLNESS
[de-identified] : Dulce Maria Raymundo is an 83-year-old woman with history of ER+ breast cancer status post T+C and radiation in 2011 and now on anastrazole (with repeat lumpectomy and radiation 2024) presenting in the setting of newly diagnosed likely metastatic colorectal cancer with disease to the liver and complicated by DVT.   She originally presented to [facility] 2/11/2025 in the setting of rectal bleeding with significant drop in hemoglobin. There, she was found to have a colonoscopy with a high rectal tumor (mod diff, KAYLIN rectal adenocarcinoma). Advanced imaging identified multifocal hepatic metastases in addition to a pancreatic lesion (biopsy c/w low grade NET). Liver lesion biopsy confirmed to be adenocarcinoma, however GATA3 and CDX2 staining is pending to differentiate colorectal from breast origin. Treatment pending result of pathology staining.   Overall well and asymptomatic except for minor shortness of breath. Remains off apixaban at this time, but does not note any new swelling in her b/l lower extremities or worsening in her shortness of breath. Overall functional.  No toxic habits or family history of cancer.   2/12/25: Colonoscopy (Colonoscopy biopsy consistent with KAYLIN mod diff invasive adenocarcinoma at 15 cm from anal verge, mod diff) 2/14/25: EGD with pancreatic lesion biopsy (Consistent with low grade pancreatic NET) 2/21/25: Liver Lesion Biopsy: Metastatic adenocarcinoma, consistent with colorectal primary 3/3/25: C1D1 FOLFOX 3/17/25-4/14/25: C2-C4 FOLFOX + panitumumab. Informed consent obtained for panitumumab  Sites: Hepatic, rectosigmoid Treatment: None (on anastrazole, now off, for ER+ breast cancer) NGS: [facility] (2/24/25) on primary tissue. KRAS/NRAS WT, APC splice site 835-*A>G, FBXW7, TP53. KAYLIN. [de-identified] : 3/31/25: Here for C3 FOLFOX + Catalino 4/14/25: C4 FOLFOX + Catalino  4/28/25: C5 FOLFOX + Catalino. Some neuropathy. No nausea or vomiting. Skin feels fine, minor rash/cheliosis. No rashes on hands or feet.  5/12/2025: C6 FOLFOX + Catalino. "Legs a little bit more tired". Scans with drastic reduction in disease burden.  5/27/25 C7 FOLFOX + Catalino. Nausea slightly worse than before and responsive to medications, constipation x 1 day. No significant neuropathy.  currently ill with URI.  6/9/25: HOLD C8 FOLFOX + Catalino. Met with Allie. Discussed curative intent surgical resection with combined liver and primary tumor resection. Originally sent for discussion of histotripsy.  6/23/25: Colonoscopy with extremely scant tumor at biopsy site. Patient doing well. Holding chemo until OR.

## 2025-06-23 NOTE — HISTORY OF PRESENT ILLNESS
[de-identified] : Dulce Maria Raymundo is an 83-year-old woman with history of ER+ breast cancer status post T+C and radiation in 2011 and now on anastrazole (with repeat lumpectomy and radiation 2024) presenting in the setting of newly diagnosed likely metastatic colorectal cancer with disease to the liver and complicated by DVT.   She originally presented to [facility] 2/11/2025 in the setting of rectal bleeding with significant drop in hemoglobin. There, she was found to have a colonoscopy with a high rectal tumor (mod diff, KAYLIN rectal adenocarcinoma). Advanced imaging identified multifocal hepatic metastases in addition to a pancreatic lesion (biopsy c/w low grade NET). Liver lesion biopsy confirmed to be adenocarcinoma, however GATA3 and CDX2 staining is pending to differentiate colorectal from breast origin. Treatment pending result of pathology staining.   Overall well and asymptomatic except for minor shortness of breath. Remains off apixaban at this time, but does not note any new swelling in her b/l lower extremities or worsening in her shortness of breath. Overall functional.  No toxic habits or family history of cancer.   2/12/25: Colonoscopy (Colonoscopy biopsy consistent with KAYLIN mod diff invasive adenocarcinoma at 15 cm from anal verge, mod diff) 2/14/25: EGD with pancreatic lesion biopsy (Consistent with low grade pancreatic NET) 2/21/25: Liver Lesion Biopsy: Metastatic adenocarcinoma, consistent with colorectal primary 3/3/25: C1D1 FOLFOX 3/17/25-4/14/25: C2-C4 FOLFOX + panitumumab. Informed consent obtained for panitumumab  Sites: Hepatic, rectosigmoid Treatment: None (on anastrazole, now off, for ER+ breast cancer) NGS: [facility] (2/24/25) on primary tissue. KRAS/NRAS WT, APC splice site 835-*A>G, FBXW7, TP53. KAYLIN. [de-identified] : 3/31/25: Here for C3 FOLFOX + Catalino 4/14/25: C4 FOLFOX + Catalino  4/28/25: C5 FOLFOX + Catalino. Some neuropathy. No nausea or vomiting. Skin feels fine, minor rash/cheliosis. No rashes on hands or feet.  5/12/2025: C6 FOLFOX + Catalino. "Legs a little bit more tired". Scans with drastic reduction in disease burden.  5/27/25 C7 FOLFOX + Catalino. Nausea slightly worse than before and responsive to medications, constipation x 1 day. No significant neuropathy.  currently ill with URI.  6/9/25: HOLD C8 FOLFOX + Catalino. Met with Allie. Discussed curative intent surgical resection with combined liver and primary tumor resection. Originally sent for discussion of histotripsy.  6/23/25: Colonoscopy with extremely scant tumor at biopsy site. Patient doing well. Holding chemo until OR.

## 2025-06-23 NOTE — PHYSICAL EXAM
[de-identified] : normal WoB, CTAB [de-identified] : RRR [de-identified] : right chest mediport, site clean without erythema

## 2025-07-03 NOTE — RESULTS/DATA
[FreeTextEntry1] : 6/18/2025: Colonoscopy shows rectal mass. Pathology with colonic mucosa with tiny foci of carcinoma compatible with the history of colorectal primary. Extremely scanty or no tumor identified on deeper levels.  6/19/2025: Sonogram liver.  Nonvisualization of metastatic lesions. Patient is not a candidate for histotripsy.

## 2025-07-03 NOTE — ASSESSMENT
[FreeTextEntry1] : Ms. Raymundo is an 84-year-old woman with a history of localized ER-positive breast cancer, currently on endocrine therapy, and newly diagnosed metastatic colorectal cancer with liver-only metastases. During her recent consultation, we discussed treatment options for her rectosigmoid primary and liver metastases. While surgery with ablation could offer curative intent, her age and asymptomatic primary tumor make continued chemotherapy and non-invasive locoregional therapies, such as histotripsy, a reasonable alternative.   However, the most recent ultrasound did not reveal any clearly identifiable target lesions for histotripsy. Today, we reviewed that the lesions are very small and not visible on ultrasound, so a follow-up MRI is needed to determine whether they have resolved after systemic therapy. This could very well be true given that her primary tumor had already a great response. We discussed that if the MRI does not show any viable lesions, we would  proceed with surveillance imaging +/- completion of her systemic therapy. However if the MRI shows any liver mets, we will discuss resection/ablation.   PLAN:  MRI with EOVIST and follow up with me in 2 weeks to discuss nextsteps.   Kimberly Kelley MD Division of Surgical Oncology Director, [facility] Hepatic Artery Infusion [facility] , [employer] [facility]   Today, I personally spent [30] minutes in total time including reviewing imaging and studies, discussing complex treatment regimens, direct face to face time with the patient, patient education and counseling.

## 2025-07-03 NOTE — END OF VISIT
[Time Spent: ___ minutes] : I have spent [unfilled] minutes of time on the encounter which excludes teaching and separately reported services. [FreeTextEntry3] :  By signing my name below, I, Kimberly Duran, attest that this documentation has been prepared under the direction and in the presence of Kimberly Kelley MD in the following sections HISTORY OF PRESENT ILLNESS; PAST MEDICAL/FAMILY/SOCIAL HISTORY; REVIEW OF SYSTEMS; PHYSICAL EXAM; ASSESSMENT/PLAN.

## 2025-07-03 NOTE — HISTORY OF PRESENT ILLNESS
[de-identified] : Mrs CANNON is a 84-year-old female being seen for a follow-up visit. Patient accompanied by spouse.   PMH: Breast neoplasm, History of Bright red blood per rectum, Deep vein thrombosis (DVT) of other vein of lower extremity, History of basal cell carcinoma, History of deep venous thrombosis, History of malignant melanoma, History of malignant neoplasm of breast, Hyperkalemia, Hyperlipidemia, unspecified hyperlipidemia type.  PSHX: History of Breast Surgery Lumpectomy, History of Total Abdominal Hysterectomy  MEDS:Anastrozole, Caltrate, Doxycycline Hyclate, Eliquis, High Absorption Magnesium, Lidocaine-Prilocaine, Ondansetron, Nausea, Prolia.    She originally presented to [facility] 2/11/2025 in the setting of rectal bleeding with significant drop in hemoglobin. There, she was found to have a colonoscopy with a high rectal tumor (mod diff, KAYLIN rectal adenocarcinoma). Advanced imaging identified multifocal hepatic metastases in addition to a pancreatic lesion (biopsy c/w low grade NET). Liver lesion biopsy confirmed to be adenocarcinoma.   Colonoscopy biopsy 2/12/25 showed consistent with KAYLIN mod diff invasive adenocarcinoma at 15 cm from anal verge, mod diff)  EGD with pancreatic lesion biopsy 2/14/25 showed consistent with low grade pancreatic NET  Liver Lesion Biopsy 2/21/25 showed metastatic adenocarcinoma, consistent with colorectal primary   3/3/25: C1D1 FOLFOX 3/17/25-4/14/25: C2-C4 FOLFOX + panitumumab. Informed consent obtained for panitumumab 3/31/25:  C3 FOLFOX + Catalino 4/14/25: C4 FOLFOX + Catalino 4/28/25: C5 FOLFOX + Catalino. Some neuropathy. No nausea or vomiting. Skin feels fine, minor rash/cheliosis 5/12/2025: C6 FOLFOX + Catalino. "Legs a little bit more tired". Scans with drastic reduction in disease burden. 5/27/25 C7 FOLFOX + Catalino. Nausea slightly worse than before and responsive to medications, constipation x 1 day. No significant neuropathy.  currently ill with URI. 5/13 CEA- 2.3  6/5/25: She feels overall well and has not had any significant issues with her chemotherapy; very functional for her age and still active and does her own ADL and no CP or SOB with activity  6/9/25: C8 FOLFOX + Catalino planned. Met with Allie. Discussed curative intent surgical resection with combined liver and primary tumor resection. Also discussed histotripsy as an adjunct treatment for liver mets which could not be addressed robotically.  6/9 CEA- 2.5 6/9 Phosphorus- 1.8  CT AP on 5/13/25 showed: Previous circumferential rectal sigmoid mass is not identified on the current exam. Bilobar hepatic metastases have decreased in size and conspicuity since 2/13/2025. No evidence of metastatic disease in the chest. Unchanged 4.1 cm thick-walled cystic lesion in the pancreatic tail.  6/12/25: pt feels well overall well. She has thought about surgery and is interested to proceed with a curative intent plan of resection primary lesion and treating all liver mets.   6/18/2025: Colonoscopy shows rectal mass. Pathology with colonic mucosa with tiny foci of carcinoma compatible with the history of colorectal primary. Extremely scanty or no tumor identified on deeper levels.  6/19/2025: Sonogram liver: Nonvisualization of metastatic lesions. Patient is not a candidate for histotripsy.  6/26/2025: She presents today to review evaluate if she is a candidate for histotripsy.  Her recent CEA is 2.5 (6/9/25).

## 2025-07-03 NOTE — HISTORY OF PRESENT ILLNESS
[de-identified] : Mrs CANNON is a 84-year-old female being seen for a follow-up visit. Patient accompanied by spouse.   PMH: Breast neoplasm, History of Bright red blood per rectum, Deep vein thrombosis (DVT) of other vein of lower extremity, History of basal cell carcinoma, History of deep venous thrombosis, History of malignant melanoma, History of malignant neoplasm of breast, Hyperkalemia, Hyperlipidemia, unspecified hyperlipidemia type.  PSHX: History of Breast Surgery Lumpectomy, History of Total Abdominal Hysterectomy  MEDS:Anastrozole, Caltrate, Doxycycline Hyclate, Eliquis, High Absorption Magnesium, Lidocaine-Prilocaine, Ondansetron, Nausea, Prolia.    She originally presented to [facility] 2/11/2025 in the setting of rectal bleeding with significant drop in hemoglobin. There, she was found to have a colonoscopy with a high rectal tumor (mod diff, KAYLIN rectal adenocarcinoma). Advanced imaging identified multifocal hepatic metastases in addition to a pancreatic lesion (biopsy c/w low grade NET). Liver lesion biopsy confirmed to be adenocarcinoma.   Colonoscopy biopsy 2/12/25 showed consistent with KAYLIN mod diff invasive adenocarcinoma at 15 cm from anal verge, mod diff)  EGD with pancreatic lesion biopsy 2/14/25 showed consistent with low grade pancreatic NET  Liver Lesion Biopsy 2/21/25 showed metastatic adenocarcinoma, consistent with colorectal primary   3/3/25: C1D1 FOLFOX 3/17/25-4/14/25: C2-C4 FOLFOX + panitumumab. Informed consent obtained for panitumumab 3/31/25:  C3 FOLFOX + Catalino 4/14/25: C4 FOLFOX + Catalino 4/28/25: C5 FOLFOX + Catalino. Some neuropathy. No nausea or vomiting. Skin feels fine, minor rash/cheliosis 5/12/2025: C6 FOLFOX + Catalino. "Legs a little bit more tired". Scans with drastic reduction in disease burden. 5/27/25 C7 FOLFOX + Catalino. Nausea slightly worse than before and responsive to medications, constipation x 1 day. No significant neuropathy.  currently ill with URI. 5/13 CEA- 2.3  6/5/25: She feels overall well and has not had any significant issues with her chemotherapy; very functional for her age and still active and does her own ADL and no CP or SOB with activity  6/9/25: C8 FOLFOX + Catalino planned. Met with Allie. Discussed curative intent surgical resection with combined liver and primary tumor resection. Also discussed histotripsy as an adjunct treatment for liver mets which could not be addressed robotically.  6/9 CEA- 2.5 6/9 Phosphorus- 1.8  CT AP on 5/13/25 showed: Previous circumferential rectal sigmoid mass is not identified on the current exam. Bilobar hepatic metastases have decreased in size and conspicuity since 2/13/2025. No evidence of metastatic disease in the chest. Unchanged 4.1 cm thick-walled cystic lesion in the pancreatic tail.  6/12/25: pt feels well overall well. She has thought about surgery and is interested to proceed with a curative intent plan of resection primary lesion and treating all liver mets.   6/18/2025: Colonoscopy shows rectal mass. Pathology with colonic mucosa with tiny foci of carcinoma compatible with the history of colorectal primary. Extremely scanty or no tumor identified on deeper levels.  6/19/2025: Sonogram liver: Nonvisualization of metastatic lesions. Patient is not a candidate for histotripsy.  6/26/2025: She presents today to review evaluate if she is a candidate for histotripsy.  Her recent CEA is 2.5 (6/9/25).

## 2025-07-03 NOTE — HISTORY OF PRESENT ILLNESS
[de-identified] : Mrs CANNON is a 84-year-old female being seen for a follow-up visit. Patient accompanied by spouse.   PMH: Breast neoplasm, History of Bright red blood per rectum, Deep vein thrombosis (DVT) of other vein of lower extremity, History of basal cell carcinoma, History of deep venous thrombosis, History of malignant melanoma, History of malignant neoplasm of breast, Hyperkalemia, Hyperlipidemia, unspecified hyperlipidemia type.  PSHX: History of Breast Surgery Lumpectomy, History of Total Abdominal Hysterectomy  MEDS:Anastrozole, Caltrate, Doxycycline Hyclate, Eliquis, High Absorption Magnesium, Lidocaine-Prilocaine, Ondansetron, Nausea, Prolia.    She originally presented to [facility] 2/11/2025 in the setting of rectal bleeding with significant drop in hemoglobin. There, she was found to have a colonoscopy with a high rectal tumor (mod diff, KAYLIN rectal adenocarcinoma). Advanced imaging identified multifocal hepatic metastases in addition to a pancreatic lesion (biopsy c/w low grade NET). Liver lesion biopsy confirmed to be adenocarcinoma.   Colonoscopy biopsy 2/12/25 showed consistent with KAYLIN mod diff invasive adenocarcinoma at 15 cm from anal verge, mod diff)  EGD with pancreatic lesion biopsy 2/14/25 showed consistent with low grade pancreatic NET  Liver Lesion Biopsy 2/21/25 showed metastatic adenocarcinoma, consistent with colorectal primary   3/3/25: C1D1 FOLFOX 3/17/25-4/14/25: C2-C4 FOLFOX + panitumumab. Informed consent obtained for panitumumab 3/31/25:  C3 FOLFOX + Catalino 4/14/25: C4 FOLFOX + Catalino 4/28/25: C5 FOLFOX + Catalino. Some neuropathy. No nausea or vomiting. Skin feels fine, minor rash/cheliosis 5/12/2025: C6 FOLFOX + Catalino. "Legs a little bit more tired". Scans with drastic reduction in disease burden. 5/27/25 C7 FOLFOX + Catalino. Nausea slightly worse than before and responsive to medications, constipation x 1 day. No significant neuropathy.  currently ill with URI. 5/13 CEA- 2.3  6/5/25: She feels overall well and has not had any significant issues with her chemotherapy; very functional for her age and still active and does her own ADL and no CP or SOB with activity  6/9/25: C8 FOLFOX + Catalino planned. Met with Allie. Discussed curative intent surgical resection with combined liver and primary tumor resection. Also discussed histotripsy as an adjunct treatment for liver mets which could not be addressed robotically.  6/9 CEA- 2.5 6/9 Phosphorus- 1.8  CT AP on 5/13/25 showed: Previous circumferential rectal sigmoid mass is not identified on the current exam. Bilobar hepatic metastases have decreased in size and conspicuity since 2/13/2025. No evidence of metastatic disease in the chest. Unchanged 4.1 cm thick-walled cystic lesion in the pancreatic tail.  6/12/25: pt feels well overall well. She has thought about surgery and is interested to proceed with a curative intent plan of resection primary lesion and treating all liver mets.   6/18/2025: Colonoscopy shows rectal mass. Pathology with colonic mucosa with tiny foci of carcinoma compatible with the history of colorectal primary. Extremely scanty or no tumor identified on deeper levels.  6/19/2025: Sonogram liver: Nonvisualization of metastatic lesions. Patient is not a candidate for histotripsy.  6/26/2025: She presents today to review evaluate if she is a candidate for histotripsy.  Her recent CEA is 2.5 (6/9/25).

## 2025-07-03 NOTE — HISTORY OF PRESENT ILLNESS
[de-identified] : Mrs CANNON is a 84-year-old female being seen for a follow-up visit. Patient accompanied by spouse.   PMH: Breast neoplasm, History of Bright red blood per rectum, Deep vein thrombosis (DVT) of other vein of lower extremity, History of basal cell carcinoma, History of deep venous thrombosis, History of malignant melanoma, History of malignant neoplasm of breast, Hyperkalemia, Hyperlipidemia, unspecified hyperlipidemia type.  PSHX: History of Breast Surgery Lumpectomy, History of Total Abdominal Hysterectomy  MEDS:Anastrozole, Caltrate, Doxycycline Hyclate, Eliquis, High Absorption Magnesium, Lidocaine-Prilocaine, Ondansetron, Nausea, Prolia.    She originally presented to [facility] 2/11/2025 in the setting of rectal bleeding with significant drop in hemoglobin. There, she was found to have a colonoscopy with a high rectal tumor (mod diff, KAYLIN rectal adenocarcinoma). Advanced imaging identified multifocal hepatic metastases in addition to a pancreatic lesion (biopsy c/w low grade NET). Liver lesion biopsy confirmed to be adenocarcinoma.   Colonoscopy biopsy 2/12/25 showed consistent with KAYLIN mod diff invasive adenocarcinoma at 15 cm from anal verge, mod diff)  EGD with pancreatic lesion biopsy 2/14/25 showed consistent with low grade pancreatic NET  Liver Lesion Biopsy 2/21/25 showed metastatic adenocarcinoma, consistent with colorectal primary   3/3/25: C1D1 FOLFOX 3/17/25-4/14/25: C2-C4 FOLFOX + panitumumab. Informed consent obtained for panitumumab 3/31/25:  C3 FOLFOX + Catalino 4/14/25: C4 FOLFOX + Catalino 4/28/25: C5 FOLFOX + Catalino. Some neuropathy. No nausea or vomiting. Skin feels fine, minor rash/cheliosis 5/12/2025: C6 FOLFOX + Catalino. "Legs a little bit more tired". Scans with drastic reduction in disease burden. 5/27/25 C7 FOLFOX + Catalino. Nausea slightly worse than before and responsive to medications, constipation x 1 day. No significant neuropathy.  currently ill with URI. 5/13 CEA- 2.3  6/5/25: She feels overall well and has not had any significant issues with her chemotherapy; very functional for her age and still active and does her own ADL and no CP or SOB with activity  6/9/25: C8 FOLFOX + Catalino planned. Met with Allie. Discussed curative intent surgical resection with combined liver and primary tumor resection. Also discussed histotripsy as an adjunct treatment for liver mets which could not be addressed robotically.  6/9 CEA- 2.5 6/9 Phosphorus- 1.8  CT AP on 5/13/25 showed: Previous circumferential rectal sigmoid mass is not identified on the current exam. Bilobar hepatic metastases have decreased in size and conspicuity since 2/13/2025. No evidence of metastatic disease in the chest. Unchanged 4.1 cm thick-walled cystic lesion in the pancreatic tail.  6/12/25: pt feels well overall well. She has thought about surgery and is interested to proceed with a curative intent plan of resection primary lesion and treating all liver mets.   6/18/2025: Colonoscopy shows rectal mass. Pathology with colonic mucosa with tiny foci of carcinoma compatible with the history of colorectal primary. Extremely scanty or no tumor identified on deeper levels.  6/19/2025: Sonogram liver: Nonvisualization of metastatic lesions. Patient is not a candidate for histotripsy.  6/26/2025: She presents today to review evaluate if she is a candidate for histotripsy.  Her recent CEA is 2.5 (6/9/25).

## 2025-07-10 NOTE — HISTORY OF PRESENT ILLNESS
[de-identified] : Mrs. Raymundo is an 84-year-old female with a complex oncologic history who presents for follow-up in the setting of rectal cancer with hepatic metastases.  Initial Presentation & Diagnosis 2/11/2025: Presented to [facility] with rectal bleeding and significant drop in hemoglobin 2/12/2025: Colonoscopy revealed high rectal mass at 15 cm from anal verge; pathology: moderately differentiated KAYLIN rectal adenocarcinoma 2/14/2025: EGD with biopsy of incidental pancreatic lesion--low-grade pancreatic neuroendocrine tumor (NET) 2/21/2025: Biopsy of liver lesions confirmed metastatic adenocarcinoma consistent with colorectal primary  Treatment Timeline 3/3/2025: Cycle 1 FOLFOX initiated 3/17-4/14/2025: Cycles 2-4 FOLFOX + panitumumab (Catalino) 4/28/2025: Cycle 5reports mild neuropathy and minor skin changes 5/12/2025: Cycle 6scans show significant disease reduction 5/27/2025: Cycle 7mild nausea, no major side effects 6/9/2025: Cycle 8 administered Overall: Patient tolerating chemotherapy well, remains active and independent with excellent functional status  Imaging & Response CT A/P 5/13/2025: Prior rectosigmoid mass no longer visualized; marked reduction in size of bilobar hepatic metastases; pancreatic tail cyst unchanged 6/18/2025: Colonoscopy: residual mass biopsiedscant residual carcinoma 6/19/2025: Liver sonogram unable to visualize lesions 6/27/2025: MRI show 2 small right hepatic lobe metastases, decreased in size compared with CT February 20, 2025. Stable complex cystic pancreatic tail lesion with cystic neuroendocrine tumor in the differential diagnosis.   CEA Trends 5/13/2025: 2.3 6/9/2025: 2.5 6/23/2025: 2.3  Surgical Evaluation 6/9/2025: I evaluated and discussed combined resection of rectal primary and liver metastases with curative intent. Histotripsy was considered but deemed not feasible (lesions not visualized on US)  Pathology done by Rene Estrada on 6/18/25 show  Rectal mass: -   Colonic mucosa with tiny foci of carcinoma compatible with the history of a colorectal primary -   Extremely scanty or no tumor identified on deeper levels of the  MR 6/27/25 show 2 small right hepatic lobe metastases, decreased in size compared with CT February 20, 2025. Stable complex cystic pancreatic tail lesion with cystic neuroendocrine tumor in the differential diagnosis.   Current Status (6/27/2025) Patient remains clinically well. She plans to go away from 8/2-8/16

## 2025-07-10 NOTE — ASSESSMENT
[FreeTextEntry1] : Ms. Raymundo is an 84-year-old woman with a history of localized ER-positive breast cancer, currently on endocrine therapy, and newly diagnosed metastatic colorectal cancer with liver-only metastases. During her recent consultation, we discussed treatment options for her rectosigmoid primary and liver metastases. While surgery with ablation could offer curative intent, her age and asymptomatic primary tumor make continued chemotherapy and non-invasive locoregional therapies, such as histotripsy, a reasonable alternative. However, the most recent ultrasound did not reveal any clearly identifiable target lesions for histotripsy. Therefore, MR ordered demonstrating 2 small right hepatic lobe metastases, decreased in size compared with CT February 20, 2025. Stable complex cystic pancreatic tail lesion with cystic neuroendocrine tumor in the differential diagnosis.  In clinic today, I discussed with the patient that her liver lesions are treatable but are currently very small and may be difficult to visualize on US. While the lesions are visible on MRI, they may not easily identified with ultrasound, which is the only available intraoperatively. Notably, during histotripsy ultrasound, the lesions were also not identifiable. I explained to her that I will use a combination of resection and microwave ablation to address these lesions. In addition, I would plan to resect the primary tumor with a complete oncologic resection. I explained that we would attempt to do this robotically with my partner Dr. Calderon. I will arrange for her to see him as well pre-op.   If one of the liver lesions is not seen intra-op given the size, we could also attempt to ablate this with CT-guidance (this would be a backup plan.).   She is very interested and wants to proceed with a curative-intent resection of both primary caner and liver mets.   PLAN:  Discuss case with medical oncology and Dr. Calderon for final plan and timing of surgery. Tentative plan for surgery the first week of September.  Repeat MRI a week before surgery to assess current disease burden and ensure no new lesions.   Kimberly Kelley MD Division of Surgical Oncology Director, [facility] & Hepatic Artery Infusion Pump , Translational Research in Surgical Oncology   Today, I personally spent [30] minutes in total time including reviewing imaging and studies, discussing complex treatment regimens, direct face to face time with the patient, patient education and counseling.

## 2025-07-10 NOTE — REASON FOR VISIT
[Follow-Up Visit] : a follow-up visit for [Colon Cancer] : colon cancer [Home] : at home, [unfilled] , at the time of the visit. [Medical Office: ([facility])___] : at the medical office located in  [Telehealth (audio & video)] : This visit was provided via telehealth using real-time 2-way audio visual technology. [Verbal consent obtained from patient] : the patient, [unfilled]

## 2025-07-10 NOTE — REASON FOR VISIT
[Follow-Up Visit] : a follow-up visit for [Colon Cancer] : colon cancer [Home] : at home, [unfilled] , at the time of the visit. [Medical Office: ([facility])___] : at the medical office located in  [Telehealth (audio & video)] : This visit was provided via telehealth using real-time 2-way audio visual technology. [Verbal consent obtained from patient] : the patient, [unfilled]

## 2025-07-10 NOTE — REASON FOR VISIT
[Follow-Up Visit] : a follow-up visit for [Colon Cancer] : colon cancer [Home] : at home, [unfilled] , at the time of the visit. [Medical Office: ([facility])___] : at the medical office located in  [Telehealth (audio & video)] : This visit was provided via telehealth using real-time 2-way audio visual technology. [Verbal consent obtained from patient] : the patient, [unfilled]

## 2025-07-10 NOTE — PHYSICAL EXAM
[Normal] : oriented to person, place and time, with appropriate affect [FreeTextEntry1] : telehealth- no visit.

## 2025-07-10 NOTE — ASSESSMENT
[FreeTextEntry1] : Ms. Raymundo is an 84-year-old woman with a history of localized ER-positive breast cancer, currently on endocrine therapy, and newly diagnosed metastatic colorectal cancer with liver-only metastases. During her recent consultation, we discussed treatment options for her rectosigmoid primary and liver metastases. While surgery with ablation could offer curative intent, her age and asymptomatic primary tumor make continued chemotherapy and non-invasive locoregional therapies, such as histotripsy, a reasonable alternative. However, the most recent ultrasound did not reveal any clearly identifiable target lesions for histotripsy. Therefore, MR ordered demonstrating 2 small right hepatic lobe metastases, decreased in size compared with CT February 20, 2025. Stable complex cystic pancreatic tail lesion with cystic neuroendocrine tumor in the differential diagnosis.  In clinic today, I discussed with the patient that her liver lesions are treatable but are currently very small and may be difficult to visualize on US. While the lesions are visible on MRI, they may not easily identified with ultrasound, which is the only available intraoperatively. Notably, during histotripsy ultrasound, the lesions were also not identifiable. I explained to her that I will use a combination of resection and microwave ablation to address these lesions. In addition, I would plan to resect the primary tumor with a complete oncologic resection. I explained that we would attempt to do this robotically with my partner Dr. Cadleron. I will arrange for her to see him as well pre-op.   If one of the liver lesions is not seen intra-op given the size, we could also attempt to ablate this with CT-guidance (this would be a backup plan.).   She is very interested and wants to proceed with a curative-intent resection of both primary caner and liver mets.   PLAN:  Discuss case with medical oncology and Dr. Calderon for final plan and timing of surgery. Tentative plan for surgery the first week of September.  Repeat MRI a week before surgery to assess current disease burden and ensure no new lesions.   Kimberly Kelley MD Division of Surgical Oncology Director, Liver [facility]iplinary Clinic & Hepatic Artery Infusion Pump , Translational Research in Surgical Oncology   Today, I personally spent [30] minutes in total time including reviewing imaging and studies, discussing complex treatment regimens, direct face to face time with the patient, patient education and counseling.

## 2025-07-10 NOTE — HISTORY OF PRESENT ILLNESS
[de-identified] : Mrs. Raymundo is an 84-year-old female with a complex oncologic history who presents for follow-up in the setting of rectal cancer with hepatic metastases.  Initial Presentation & Diagnosis 2/11/2025: Presented to [facility] with rectal bleeding and significant drop in hemoglobin 2/12/2025: Colonoscopy revealed high rectal mass at 15 cm from anal verge; pathology: moderately differentiated KAYLIN rectal adenocarcinoma 2/14/2025: EGD with biopsy of incidental pancreatic lesion--low-grade pancreatic neuroendocrine tumor (NET) 2/21/2025: Biopsy of liver lesions confirmed metastatic adenocarcinoma consistent with colorectal primary  Treatment Timeline 3/3/2025: Cycle 1 FOLFOX initiated 3/17-4/14/2025: Cycles 2-4 FOLFOX + panitumumab (Catalino) 4/28/2025: Cycle 5reports mild neuropathy and minor skin changes 5/12/2025: Cycle 6scans show significant disease reduction 5/27/2025: Cycle 7mild nausea, no major side effects 6/9/2025: Cycle 8 administered Overall: Patient tolerating chemotherapy well, remains active and independent with excellent functional status  Imaging & Response CT A/P 5/13/2025: Prior rectosigmoid mass no longer visualized; marked reduction in size of bilobar hepatic metastases; pancreatic tail cyst unchanged 6/18/2025: Colonoscopy: residual mass biopsiedscant residual carcinoma 6/19/2025: Liver sonogram unable to visualize lesions 6/27/2025: MRI show 2 small right hepatic lobe metastases, decreased in size compared with CT February 20, 2025. Stable complex cystic pancreatic tail lesion with cystic neuroendocrine tumor in the differential diagnosis.   CEA Trends 5/13/2025: 2.3 6/9/2025: 2.5 6/23/2025: 2.3  Surgical Evaluation 6/9/2025: I evaluated and discussed combined resection of rectal primary and liver metastases with curative intent. Histotripsy was considered but deemed not feasible (lesions not visualized on US)  Pathology done by Rene Estrada on 6/18/25 show  Rectal mass: -   Colonic mucosa with tiny foci of carcinoma compatible with the history of a colorectal primary -   Extremely scanty or no tumor identified on deeper levels of the  MR 6/27/25 show 2 small right hepatic lobe metastases, decreased in size compared with CT February 20, 2025. Stable complex cystic pancreatic tail lesion with cystic neuroendocrine tumor in the differential diagnosis.   Current Status (6/27/2025) Patient remains clinically well. She plans to go away from 8/2-8/16

## 2025-07-10 NOTE — REASON FOR VISIT
[Follow-Up Visit] : a follow-up visit for [Colon Cancer] : colon cancer [Home] : at home, [unfilled] , at the time of the visit. [Medical Office: ([facility])___] : at the medical office located in  [Telehealth (audio & video)] : This visit was provided via telehealth using real-time 2-way audio visual technology. [Verbal consent obtained from patient] : the patient, [unfilled]

## 2025-07-10 NOTE — HISTORY OF PRESENT ILLNESS
[de-identified] : Mrs. Raymundo is an 84-year-old female with a complex oncologic history who presents for follow-up in the setting of rectal cancer with hepatic metastases.  Initial Presentation & Diagnosis 2/11/2025: Presented to [facility] with rectal bleeding and significant drop in hemoglobin 2/12/2025: Colonoscopy revealed high rectal mass at 15 cm from anal verge; pathology: moderately differentiated KAYLIN rectal adenocarcinoma 2/14/2025: EGD with biopsy of incidental pancreatic lesion--low-grade pancreatic neuroendocrine tumor (NET) 2/21/2025: Biopsy of liver lesions confirmed metastatic adenocarcinoma consistent with colorectal primary  Treatment Timeline 3/3/2025: Cycle 1 FOLFOX initiated 3/17-4/14/2025: Cycles 2-4 FOLFOX + panitumumab (Catalino) 4/28/2025: Cycle 5reports mild neuropathy and minor skin changes 5/12/2025: Cycle 6scans show significant disease reduction 5/27/2025: Cycle 7mild nausea, no major side effects 6/9/2025: Cycle 8 administered Overall: Patient tolerating chemotherapy well, remains active and independent with excellent functional status  Imaging & Response CT A/P 5/13/2025: Prior rectosigmoid mass no longer visualized; marked reduction in size of bilobar hepatic metastases; pancreatic tail cyst unchanged 6/18/2025: Colonoscopy: residual mass biopsiedscant residual carcinoma 6/19/2025: Liver sonogram unable to visualize lesions 6/27/2025: MRI show 2 small right hepatic lobe metastases, decreased in size compared with CT February 20, 2025. Stable complex cystic pancreatic tail lesion with cystic neuroendocrine tumor in the differential diagnosis.   CEA Trends 5/13/2025: 2.3 6/9/2025: 2.5 6/23/2025: 2.3  Surgical Evaluation 6/9/2025: I evaluated and discussed combined resection of rectal primary and liver metastases with curative intent. Histotripsy was considered but deemed not feasible (lesions not visualized on US)  Pathology done by Rene Estrada on 6/18/25 show  Rectal mass: -   Colonic mucosa with tiny foci of carcinoma compatible with the history of a colorectal primary -   Extremely scanty or no tumor identified on deeper levels of the  MR 6/27/25 show 2 small right hepatic lobe metastases, decreased in size compared with CT February 20, 2025. Stable complex cystic pancreatic tail lesion with cystic neuroendocrine tumor in the differential diagnosis.   Current Status (6/27/2025) Patient remains clinically well. She plans to go away from 8/2-8/16

## 2025-07-10 NOTE — HISTORY OF PRESENT ILLNESS
[de-identified] : Mrs. Raymundo is an 84-year-old female with a complex oncologic history who presents for follow-up in the setting of rectal cancer with hepatic metastases.  Initial Presentation & Diagnosis 2/11/2025: Presented to [facility] with rectal bleeding and significant drop in hemoglobin 2/12/2025: Colonoscopy revealed high rectal mass at 15 cm from anal verge; pathology: moderately differentiated KAYLIN rectal adenocarcinoma 2/14/2025: EGD with biopsy of incidental pancreatic lesion--low-grade pancreatic neuroendocrine tumor (NET) 2/21/2025: Biopsy of liver lesions confirmed metastatic adenocarcinoma consistent with colorectal primary  Treatment Timeline 3/3/2025: Cycle 1 FOLFOX initiated 3/17-4/14/2025: Cycles 2-4 FOLFOX + panitumumab (Catalino) 4/28/2025: Cycle 5reports mild neuropathy and minor skin changes 5/12/2025: Cycle 6scans show significant disease reduction 5/27/2025: Cycle 7mild nausea, no major side effects 6/9/2025: Cycle 8 administered Overall: Patient tolerating chemotherapy well, remains active and independent with excellent functional status  Imaging & Response CT A/P 5/13/2025: Prior rectosigmoid mass no longer visualized; marked reduction in size of bilobar hepatic metastases; pancreatic tail cyst unchanged 6/18/2025: Colonoscopy: residual mass biopsiedscant residual carcinoma 6/19/2025: Liver sonogram unable to visualize lesions 6/27/2025: MRI show 2 small right hepatic lobe metastases, decreased in size compared with CT February 20, 2025. Stable complex cystic pancreatic tail lesion with cystic neuroendocrine tumor in the differential diagnosis.   CEA Trends 5/13/2025: 2.3 6/9/2025: 2.5 6/23/2025: 2.3  Surgical Evaluation 6/9/2025: I evaluated and discussed combined resection of rectal primary and liver metastases with curative intent. Histotripsy was considered but deemed not feasible (lesions not visualized on US)  Pathology done by Rene Estrada on 6/18/25 show  Rectal mass: -   Colonic mucosa with tiny foci of carcinoma compatible with the history of a colorectal primary -   Extremely scanty or no tumor identified on deeper levels of the  MR 6/27/25 show 2 small right hepatic lobe metastases, decreased in size compared with CT February 20, 2025. Stable complex cystic pancreatic tail lesion with cystic neuroendocrine tumor in the differential diagnosis.   Current Status (6/27/2025) Patient remains clinically well. She plans to go away from 8/2-8/16

## 2025-07-13 NOTE — HISTORY OF PRESENT ILLNESS
[Home] : at home, [unfilled] , at the time of the visit. [Medical Office: ([facility])___] : at the medical office located in  [Verbal consent obtained from patient] : the patient, [unfilled] [Telephone (audio)] : This telephonic visit was provided via audio only technology. [Technical] : patient unable to effectively utilize tele-video due to technical issues. [Patient preference] : patient preference. [de-identified] : Mrs. Raymundo is an 84-year-old female with a history of rectal adenocarcinoma with hepatic metastases and an incidental pancreatic neuroendocrine tumor (NET), currently under evaluation for potential curative resection.  Initial Presentation & Diagnosis: 2/11/25: Presented to [facility] with rectal bleeding and symptomatic anemia. 2/12/25: Colonoscopy revealed a high rectal mass 15 cm from anal verge; pathology confirmed moderately differentiated KAYLIN rectal adenocarcinoma. 2/14/25: EGD with biopsy of an incidental pancreatic lesion revealed low-grade NET. 2/21/25: Liver lesion biopsy confirmed metastatic adenocarcinoma consistent with colorectal primary.  Treatment History: 3/3/25: Initiated FOLFOX chemotherapy. 3/17-4/14/25: Cycles 2-4 with FOLFOX + panitumumab. 4/28/25: Cycle 5; reported mild neuropathy and minor skin changes. 5/12/25: Cycle 6; imaging showed significant disease reduction. 5/27/25: Cycle 7; mild nausea, otherwise well tolerated. 6/9/25: Cycle 8 administered. 6/23/25: Colonoscopy with extremely scant tumor at biopsy site. Patient doing well. Holding chemo until OR. Overall, she has tolerated chemotherapy well with preserved performance status.  Imaging and Response: 5/13/25 CT A/P: Prior rectosigmoid mass no longer visualized; marked reduction in hepatic mets; stable pancreatic cyst. 6/18/25 Colonoscopy: Residual rectal mass biopsied; scant carcinoma. 6/19/25 Liver US: Unable to visualize lesions. 6/27/25 MRI: Two small right hepatic lobe metastases decreased in size; stable complex cystic lesion in pancreatic tail, NET still in differential.  Pathology (6/18/25): Rectal mass with colonic mucosa showing only tiny foci of carcinoma; deeper levels showed extremely scant or no residual tumor (Rene Estrada).  CEA Trends: 5/13/25: 2.3 6/9/25: 2.5 6/23/25: 2.3   Current Status (7/10/25): Patient remains clinically well, functionally independent, and plans to travel from 8/2-8/16. Treatment strategy was reviewed today. Pt overall is doing well and is anxious to get to the next steps in her care.

## 2025-07-13 NOTE — ASSESSMENT
[FreeTextEntry1] : Ms. Raymundo is an 84 year-old woman with a history of localized ER-positive breast cancer, currently on endocrine therapy, and newly diagnosed metastatic colorectal cancer with liver-only metastases, s/p systemic therapy with a great response. Her recent MR demonstrates 2 small right hepatic lobe metastases, decreased in size compared with CT February 20, 2025.   During today's clinic visit, I informed the patient that I have discussed her case with Dr. Hunter and Yumiko and we are all in agreement on proceeding with surgical resection of the colon, with the goal of removing all visible liver lesions at the time of surgery. Given the low burden of disease and her great response, we will forgo any additional systemic therapy until her OR date which we booked based on her vacation schedule and preference.  We reviewed the preoperative testing, including necessary imaging, lab work, and medical clearances. I addressed all of her questions and concerns, and she expressed understanding and agreement with the plan.  PLAN:  Prior to surgery she needs PST, medical clearence, and stop taking antocoagulation 3 days before surgery  Plan on seeing Dr. Ruvalcaba for pre-op discussion on colon resection  Follow up with me on August 14th for pre-op consent  Repeat MRI a week before surgery to assess current disease burden and ensure no new lesions.  Tentative plan for surgery on September 5th.   Kimberly Kelley MD Division of Surgical Oncology Director, Liver Multi-Disciplinary Clinic & Hepatic Artery Infusion Pump , Translational Research in Surgical Oncology   Today, I personally spent [40] minutes in total time including reviewing imaging and studies, discussing complex treatment regimens, direct face to face time with the patient, patient education and counseling.

## 2025-07-13 NOTE — HISTORY OF PRESENT ILLNESS
[Home] : at home, [unfilled] , at the time of the visit. [Medical Office: ([facility])___] : at the medical office located in  [Verbal consent obtained from patient] : the patient, [unfilled] [Telephone (audio)] : This telephonic visit was provided via audio only technology. [Technical] : patient unable to effectively utilize tele-video due to technical issues. [Patient preference] : patient preference. [de-identified] : Mrs. Raymundo is an 84-year-old female with a history of rectal adenocarcinoma with hepatic metastases and an incidental pancreatic neuroendocrine tumor (NET), currently under evaluation for potential curative resection.  Initial Presentation & Diagnosis: 2/11/25: Presented to [facility] with rectal bleeding and symptomatic anemia. 2/12/25: Colonoscopy revealed a high rectal mass 15 cm from anal verge; pathology confirmed moderately differentiated KAYLIN rectal adenocarcinoma. 2/14/25: EGD with biopsy of an incidental pancreatic lesion revealed low-grade NET. 2/21/25: Liver lesion biopsy confirmed metastatic adenocarcinoma consistent with colorectal primary.  Treatment History: 3/3/25: Initiated FOLFOX chemotherapy. 3/17-4/14/25: Cycles 2-4 with FOLFOX + panitumumab. 4/28/25: Cycle 5; reported mild neuropathy and minor skin changes. 5/12/25: Cycle 6; imaging showed significant disease reduction. 5/27/25: Cycle 7; mild nausea, otherwise well tolerated. 6/9/25: Cycle 8 administered. 6/23/25: Colonoscopy with extremely scant tumor at biopsy site. Patient doing well. Holding chemo until OR. Overall, she has tolerated chemotherapy well with preserved performance status.  Imaging and Response: 5/13/25 CT A/P: Prior rectosigmoid mass no longer visualized; marked reduction in hepatic mets; stable pancreatic cyst. 6/18/25 Colonoscopy: Residual rectal mass biopsied; scant carcinoma. 6/19/25 Liver US: Unable to visualize lesions. 6/27/25 MRI: Two small right hepatic lobe metastases decreased in size; stable complex cystic lesion in pancreatic tail, NET still in differential.  Pathology (6/18/25): Rectal mass with colonic mucosa showing only tiny foci of carcinoma; deeper levels showed extremely scant or no residual tumor (Rene Estrada).  CEA Trends: 5/13/25: 2.3 6/9/25: 2.5 6/23/25: 2.3   Current Status (7/10/25): Patient remains clinically well, functionally independent, and plans to travel from 8/2[place]8/16. Treatment strategy was reviewed today. Pt overall is doing well and is anxious to get to the next steps in her care.

## 2025-07-13 NOTE — HISTORY OF PRESENT ILLNESS
[Home] : at home, [unfilled] , at the time of the visit. [Medical Office: ([facility])___] : at the medical office located in  [Verbal consent obtained from patient] : the patient, [unfilled] [Telephone (audio)] : This telephonic visit was provided via audio only technology. [Technical] : patient unable to effectively utilize tele-video due to technical issues. [Patient preference] : patient preference. [de-identified] : Mrs. Raymundo is an 84-year-old female with a history of rectal adenocarcinoma with hepatic metastases and an incidental pancreatic neuroendocrine tumor (NET), currently under evaluation for potential curative resection.  Initial Presentation & Diagnosis: 2/11/25: Presented to [facility] with rectal bleeding and symptomatic anemia. 2/12/25: Colonoscopy revealed a high rectal mass 15 cm from anal verge; pathology confirmed moderately differentiated KAYLIN rectal adenocarcinoma. 2/14/25: EGD with biopsy of an incidental pancreatic lesion revealed low-grade NET. 2/21/25: Liver lesion biopsy confirmed metastatic adenocarcinoma consistent with colorectal primary.  Treatment History: 3/3/25: Initiated FOLFOX chemotherapy. 3/17-4/14/25: Cycles 2-4 with FOLFOX + panitumumab. 4/28/25: Cycle 5; reported mild neuropathy and minor skin changes. 5/12/25: Cycle 6; imaging showed significant disease reduction. 5/27/25: Cycle 7; mild nausea, otherwise well tolerated. 6/9/25: Cycle 8 administered. 6/23/25: Colonoscopy with extremely scant tumor at biopsy site. Patient doing well. Holding chemo until OR. Overall, she has tolerated chemotherapy well with preserved performance status.  Imaging and Response: 5/13/25 CT A/P: Prior rectosigmoid mass no longer visualized; marked reduction in hepatic mets; stable pancreatic cyst. 6/18/25 Colonoscopy: Residual rectal mass biopsied; scant carcinoma. 6/19/25 Liver US: Unable to visualize lesions. 6/27/25 MRI: Two small right hepatic lobe metastases decreased in size; stable complex cystic lesion in pancreatic tail, NET still in differential.  Pathology (6/18/25): Rectal mass with colonic mucosa showing only tiny foci of carcinoma; deeper levels showed extremely scant or no residual tumor (Rene Estrada).  CEA Trends: 5/13/25: 2.3 6/9/25: 2.5 6/23/25: 2.3   Current Status (7/10/25): Patient remains clinically well, functionally independent, and plans to travel from 8/2-8/16. Treatment strategy was reviewed today. Pt overall is doing well and is anxious to get to the next steps in her care.

## 2025-07-13 NOTE — HISTORY OF PRESENT ILLNESS
[Home] : at home, [unfilled] , at the time of the visit. [Medical Office: ([facility])___] : at the medical office located in  [Verbal consent obtained from patient] : the patient, [unfilled] [Telephone (audio)] : This telephonic visit was provided via audio only technology. [Technical] : patient unable to effectively utilize tele-video due to technical issues. [Patient preference] : patient preference. [de-identified] : Mrs. Raymundo is an 84-year-old female with a history of rectal adenocarcinoma with hepatic metastases and an incidental pancreatic neuroendocrine tumor (NET), currently under evaluation for potential curative resection.  Initial Presentation & Diagnosis: 2/11/25: Presented to [facility] with rectal bleeding and symptomatic anemia. 2/12/25: Colonoscopy revealed a high rectal mass 15 cm from anal verge; pathology confirmed moderately differentiated KAYLIN rectal adenocarcinoma. 2/14/25: EGD with biopsy of an incidental pancreatic lesion revealed low-grade NET. 2/21/25: Liver lesion biopsy confirmed metastatic adenocarcinoma consistent with colorectal primary.  Treatment History: 3/3/25: Initiated FOLFOX chemotherapy. 3/17-4/14/25: Cycles 2-4 with FOLFOX + panitumumab. 4/28/25: Cycle 5; reported mild neuropathy and minor skin changes. 5/12/25: Cycle 6; imaging showed significant disease reduction. 5/27/25: Cycle 7; mild nausea, otherwise well tolerated. 6/9/25: Cycle 8 administered. 6/23/25: Colonoscopy with extremely scant tumor at biopsy site. Patient doing well. Holding chemo until OR. Overall, she has tolerated chemotherapy well with preserved performance status.  Imaging and Response: 5/13/25 CT A/P: Prior rectosigmoid mass no longer visualized; marked reduction in hepatic mets; stable pancreatic cyst. 6/18/25 Colonoscopy: Residual rectal mass biopsied; scant carcinoma. 6/19/25 Liver US: Unable to visualize lesions. 6/27/25 MRI: Two small right hepatic lobe metastases decreased in size; stable complex cystic lesion in pancreatic tail, NET still in differential.  Pathology (6/18/25): Rectal mass with colonic mucosa showing only tiny foci of carcinoma; deeper levels showed extremely scant or no residual tumor (Rene Estrada).  CEA Trends: 5/13/25: 2.3 6/9/25: 2.5 6/23/25: 2.3   Current Status (7/10/25): Patient remains clinically well, functionally independent, and plans to travel from 8/2-8/16. Treatment strategy was reviewed today. Pt overall is doing well and is anxious to get to the next steps in her care.

## 2025-07-14 NOTE — PHYSICAL EXAM
[Fully active, able to carry on all pre-disease performance without restriction] : Status 0 - Fully active, able to carry on all pre-disease performance without restriction [Normal] : affect appropriate [de-identified] : normal WoB, CTAB [de-identified] : RRR [de-identified] : right chest mediport, site clean without erythema

## 2025-07-14 NOTE — PHYSICAL EXAM
[Fully active, able to carry on all pre-disease performance without restriction] : Status 0 - Fully active, able to carry on all pre-disease performance without restriction [Normal] : affect appropriate [de-identified] : normal WoB, CTAB [de-identified] : RRR [de-identified] : right chest mediport, site clean without erythema

## 2025-07-14 NOTE — HISTORY OF PRESENT ILLNESS
[de-identified] : Dulce Maria Raymundo is an 83-year-old woman with history of ER+ breast cancer status post T+C and radiation in 2011 and now on anastrazole (with repeat lumpectomy and radiation 2024) presenting in the setting of newly diagnosed likely metastatic colorectal cancer with disease to the liver and complicated by DVT.   She originally presented to [facility] 2/11/2025 in the setting of rectal bleeding with significant drop in hemoglobin. There, she was found to have a colonoscopy with a high rectal tumor (mod diff, KAYLIN rectal adenocarcinoma). Advanced imaging identified multifocal hepatic metastases in addition to a pancreatic lesion (biopsy c/w low grade NET). Liver lesion biopsy confirmed to be adenocarcinoma, however GATA3 and CDX2 staining is pending to differentiate colorectal from breast origin. Treatment pending result of pathology staining.   Overall well and asymptomatic except for minor shortness of breath. Remains off apixaban at this time, but does not note any new swelling in her b/l lower extremities or worsening in her shortness of breath. Overall functional.  No toxic habits or family history of cancer.   2/12/25: Colonoscopy (Colonoscopy biopsy consistent with KAYLIN mod diff invasive adenocarcinoma at 15 cm from anal verge, mod diff) 2/14/25: EGD with pancreatic lesion biopsy (Consistent with low grade pancreatic NET) 2/21/25: Liver Lesion Biopsy: Metastatic adenocarcinoma, consistent with colorectal primary 3/3/25: C1D1 FOLFOX 3/17/25-4/14/25: C2-C4 FOLFOX + panitumumab. Informed consent obtained for panitumumab  Sites: Hepatic, rectosigmoid Treatment: None (on anastrazole, now off, for ER+ breast cancer) NGS: Foundation (2/24/25) on primary tissue. KRAS/NRAS WT, APC splice site 835-*A>G, FBXW7, TP53. KAYLIN. [de-identified] : 3/31/25: Here for C3 FOLFOX + Catalino 4/14/25: C4 FOLFOX + Catalino  4/28/25: C5 FOLFOX + Catalino. Some neuropathy. No nausea or vomiting. Skin feels fine, minor rash/cheliosis. No rashes on hands or feet.  5/12/2025: C6 FOLFOX + Catalino. "Legs a little bit more tired". Scans with drastic reduction in disease burden.  5/27/25 C7 FOLFOX + Catalino. Nausea slightly worse than before and responsive to medications, constipation x 1 day. No significant neuropathy.  currently ill with URI.  6/9/25: HOLD C8 FOLFOX + Catalino. Met with Allie. Discussed curative intent surgical resection with combined liver and primary tumor resection. Originally sent for discussion of histotripsy.  6/23/25: Colonoscopy with extremely scant tumor at biopsy site. Patient doing well. Holding chemo until OR.  7/14/25: Overall well, seen by Allie recently.

## 2025-07-14 NOTE — ASSESSMENT
[Palliative] : Goals of care discussed with patient: Palliative [FreeTextEntry1] : Ms. Raymundo is an 83 year old woman with history of local ER+ breast cancer on endocrine therapy with recently diagnosed likely metastatic colorectal cancer; biopsy of the liver is pending to delineate metastatic colorectal from metastatic breast cancer. Today we discussed the natural history of her likely metastatic rectosigmoid cancer, the high probability that her hepatic lesions are related to her recently identified rectosigmoid mass, indications for treatment, and the hope that systemic therapy could lead to increased life expectancy and decreased symptoms. We discussed the palliative nature of therapy, that we could not eradicate this cancer and that systemic therapy is not a curative strategy. There are at least 3 metastatic hepatic lesions noted on PET, and likely more under the limit of detection; notably she is without active retroperitoneal lymph nodes; we are working to obtain her PET images for review. Given age, unlikely that DOUG will be an option.   For now, we discussed initiating systemic therapy with FOLFOX, and will add a monoclonal antibody following NGS results. C1 FOLFOX 3/3/25, tolerated well with minimal side effects  3/17/25: Discussed results of molecular testing. KAYLIN. KRAS/NRAS wildtype. Discussed addition of mab EGFR therapy with panitumumab to FOLFOX backbone.   3/31/25: Here for C3. Tolerating therapy well. No complaints.   4/14/25: C4 FOLFOX + Catalino. Tolerating well, no rash.   4/28/25: C5 FOLFOX + Catalino. Due for imaging next. Minor neuropathy.   5/12/25: C6 FOLFOX + Catalino. Excellent radiographic response. Discussed histotripsy for potential local control of hepatic disease.   5/27/25: C7 FOLFOX + Catalino. No neuropathy. Overall well. Seeing Allie to discuss histrotripsy.   6/9/25: Had prolonged discussion regarding risks/benefits of targeted approaches, including surgical resection. Will hold chemotherapy today so as to not further shrink lesions (to allow targeting intra-op or with histotripsy).   6/23/25: Continued conversation regarding surgery vs additional chemotherapy, went over colonoscopy results. Amazing response to chemotherapy. Alvin pending 6/9.   7/14/25: Long conversation today regarding plan of care and overall goal. Went over location, planning, timeline.   # Metastatic Colorectal Cancer (KAYLIN, KRAS/NRAS WT): Colonoscopy 6/2025 with extremely scant tumor. Following up with Allie for possible hepatic resection. U/S unable to visualize lesions.  - Plan for early September resection  - Chemo teach and consent 2/24/2025. Panitumumab consent obtained 3/17 - FOLFOX C1 3/10/25 - > FOLFOX + Catalino C3 - Moisturizer (cerave), doxycycline 100mg daily with food (rx sent) to prevent dermatologic toxicities that may occur  #History Breast Cancer: - Had [facility] appointment. Stopped anastrozole (reported 2/2 risk of blood clots) - Currently getting mammogram at [facility]  #DVT: Completed 3 months of treatment. Given risk of thrombosis with malignancy, will provide prophylactic dosing of Eliquis 2.5 mg BID. - Eliquis 2.5mg BID - Monitor for recurrent LE swelling or SoB - Return precautions/red flags discussed  #Chemotherapy Induced Nausea - Zofran prescribed; to start +D3 after chemo

## 2025-07-14 NOTE — ASSESSMENT
[Palliative] : Goals of care discussed with patient: Palliative [FreeTextEntry1] : Ms. Raymundo is an 83 year old woman with history of local ER+ breast cancer on endocrine therapy with recently diagnosed likely metastatic colorectal cancer; biopsy of the liver is pending to delineate metastatic colorectal from metastatic breast cancer. Today we discussed the natural history of her likely metastatic rectosigmoid cancer, the high probability that her hepatic lesions are related to her recently identified rectosigmoid mass, indications for treatment, and the hope that systemic therapy could lead to increased life expectancy and decreased symptoms. We discussed the palliative nature of therapy, that we could not eradicate this cancer and that systemic therapy is not a curative strategy. There are at least 3 metastatic hepatic lesions noted on PET, and likely more under the limit of detection; notably she is without active retroperitoneal lymph nodes; we are working to obtain her PET images for review. Given age, unlikely that DOUG will be an option.   For now, we discussed initiating systemic therapy with FOLFOX, and will add a monoclonal antibody following NGS results. C1 FOLFOX 3/3/25, tolerated well with minimal side effects  3/17/25: Discussed results of molecular testing. KAYLIN. KRAS/NRAS wildtype. Discussed addition of mab EGFR therapy with panitumumab to FOLFOX backbone.   3/31/25: Here for C3. Tolerating therapy well. No complaints.   4/14/25: C4 FOLFOX + Catalino. Tolerating well, no rash.   4/28/25: C5 FOLFOX + Catalino. Due for imaging next. Minor neuropathy.   5/12/25: C6 FOLFOX + Catalino. Excellent radiographic response. Discussed histotripsy for potential local control of hepatic disease.   5/27/25: C7 FOLFOX + Catalino. No neuropathy. Overall well. Seeing Allie to discuss histrotripsy.   6/9/25: Had prolonged discussion regarding risks/benefits of targeted approaches, including surgical resection. Will hold chemotherapy today so as to not further shrink lesions (to allow targeting intra-op or with histotripsy).   6/23/25: Continued conversation regarding surgery vs additional chemotherapy, went over colonoscopy results. Amazing response to chemotherapy. Alvin pending 6/9.   7/14/25: Long conversation today regarding plan of care and overall goal. Went over location, planning, timeline.   # Metastatic Colorectal Cancer (KAYLIN, KRAS/NRAS WT): Colonoscopy 6/2025 with extremely scant tumor. Following up with Allie for possible hepatic resection. U/S unable to visualize lesions.  - Plan for early September resection  - Chemo teach and consent 2/24/2025. Panitumumab consent obtained 3/17 - FOLFOX C1 3/10/25 - > FOLFOX + Catalino C3 - Moisturizer (cerave), doxycycline 100mg daily with food (rx sent) to prevent dermatologic toxicities that may occur  #History Breast Cancer: - Had [facility] appointment. Stopped anastrozole (reported 2/2 risk of blood clots) - Currently getting mammogram at [facility] [place]  #DVT: Completed 3 months of treatment. Given risk of thrombosis with malignancy, will provide prophylactic dosing of Eliquis 2.5 mg BID. - Eliquis 2.5mg BID - Monitor for recurrent LE swelling or SoB - Return precautions/red flags discussed  #Chemotherapy Induced Nausea - Zofran prescribed; to start +D3 after chemo

## 2025-07-14 NOTE — HISTORY OF PRESENT ILLNESS
[de-identified] : Dulce Maria Raymundo is an 83-year-old woman with history of ER+ breast cancer status post T+C and radiation in 2011 and now on anastrazole (with repeat lumpectomy and radiation 2024) presenting in the setting of newly diagnosed likely metastatic colorectal cancer with disease to the liver and complicated by DVT.   She originally presented to [facility] 2/11/2025 in the setting of rectal bleeding with significant drop in hemoglobin. There, she was found to have a colonoscopy with a high rectal tumor (mod diff, KAYLIN rectal adenocarcinoma). Advanced imaging identified multifocal hepatic metastases in addition to a pancreatic lesion (biopsy c/w low grade NET). Liver lesion biopsy confirmed to be adenocarcinoma, however GATA3 and CDX2 staining is pending to differentiate colorectal from breast origin. Treatment pending result of pathology staining.   Overall well and asymptomatic except for minor shortness of breath. Remains off apixaban at this time, but does not note any new swelling in her b/l lower extremities or worsening in her shortness of breath. Overall functional.  No toxic habits or family history of cancer.   2/12/25: Colonoscopy (Colonoscopy biopsy consistent with AKYLIN mod diff invasive adenocarcinoma at 15 cm from anal verge, mod diff) 2/14/25: EGD with pancreatic lesion biopsy (Consistent with low grade pancreatic NET) 2/21/25: Liver Lesion Biopsy: Metastatic adenocarcinoma, consistent with colorectal primary 3/3/25: C1D1 FOLFOX 3/17/25-4/14/25: C2-C4 FOLFOX + panitumumab. Informed consent obtained for panitumumab  Sites: Hepatic, rectosigmoid Treatment: None (on anastrazole, now off, for ER+ breast cancer) NGS: Foundation (2/24/25) on primary tissue. KRAS/NRAS WT, APC splice site 835-*A>G, FBXW7, TP53. KAYLIN. [de-identified] : 3/31/25: Here for C3 FOLFOX + Catalino 4/14/25: C4 FOLFOX + Catalino  4/28/25: C5 FOLFOX + Catalino. Some neuropathy. No nausea or vomiting. Skin feels fine, minor rash/cheliosis. No rashes on hands or feet.  5/12/2025: C6 FOLFOX + Catalino. "Legs a little bit more tired". Scans with drastic reduction in disease burden.  5/27/25 C7 FOLFOX + Catalino. Nausea slightly worse than before and responsive to medications, constipation x 1 day. No significant neuropathy.  currently ill with URI.  6/9/25: HOLD C8 FOLFOX + Catalino. Met with Allie. Discussed curative intent surgical resection with combined liver and primary tumor resection. Originally sent for discussion of histotripsy.  6/23/25: Colonoscopy with extremely scant tumor at biopsy site. Patient doing well. Holding chemo until OR.  7/14/25: Overall well, seen by Allie recently.

## 2025-07-24 NOTE — CONSULT LETTER
[Dear  ___] : Dear  [unfilled], [Consult Letter:] : I had the pleasure of evaluating your patient, [unfilled]. [Please see my note below.] : Please see my note below. [Consult Closing:] : Thank you very much for allowing me to participate in the care of this patient.  If you have any questions, please do not hesitate to contact me. [Sincerely,] : Sincerely, [DrSara  ___] : Dr. CUELLAR [DrSara ___] : Dr. CUELLAR [FreeTextEntry2] : Kimberly Kelley MD [FreeTextEntry3] : Hemant Calderon MD Surgical Oncology [facility] Office: [phone] Cell: [phone]

## 2025-07-24 NOTE — HISTORY OF PRESENT ILLNESS
[de-identified] : Ms. DULCE MARIA CANNON is an 84-year-old woman, referred by colleague Dr. Kimberly Kelley for consultation regarding metastatic colon cancer, here for an initial visit.  Dulce Maria initially presented to [facility] in 2025 w/ symptomatic anemia & rectal bleeding. On colonoscopy she was found to have a high rectal mass, 15 cm from anal verge - path positive for moderately differentiated invasive adeno, MMR-P.  colonoscopy 2025 ([facility]), Dr. Peter Huynh - at this time last Cscope was ~10 years prior  - mucosal rectal mass w/ ulceration, granularity, friability, erythema & irregular margins w/ contact bleeding noted, 10-15 cm from anal verge & occupying 75% of the circumference of the lumen, tattoo 5 cm distally (@ 20 cm) *bx positive for adeno  - multiple other polyps removed   EUS 2025 ([facility]) Dr. Rajiv Campos (after imaging showed findings c/f pancreas mass) - PD nondilated, measuring 4.4 mm at pancreas neck - 2.1 cm thick-walled peripancreatic cyst was seen near the tail of the pancreas & appeared to have a fluid level within. CBD not dilated, normal celiac axis, FNA performed **path positive for malignant cells, c/w PNET w/ cystic changes, ki67: <1 %  US biopsy of liver lesion 2025 - positive for metastatic adenocarcinoma c/w colorectal primary   CT C/A/P 2025 - previous circumferential rectal sigmoid mass is not identified on current exam - bilobar hepatic mets have decreased in size & conspicuity since 2025 - no evidence of mets in chest - unchanged 4.1 cm thick-walled cystic lesion in pancreas tail   She started tx w/ FOLFOX 3/2025, completed 7 cycles as of 2025.  PMH/PSH: B/L metachronous breast cancer (left stage 1A , lumpectomy & RT, triple negative right stage 1A 2011 w/ lumpectomy, adjuvant TC & RT, RIGHT breast hormone positive 2023 followed by lumpectomy & RT - started Anastrozole and ultimately stopped r/t complications, was tx at [facility] [place]) KIN-BSO (~ for fibroids w/ no HRT), DVT (2024), osteoporosis  Meds:  Eliquis 2.5 mg BID, Calcium supplements,  ALL:  denies  SH:  denies tobacco or ETOH use, lives w/ her , retired  FH: mother  from metastatic breast cancer (30's), bother w/ lung cancer (50's), maternal uncle  from colon cancer (70's) GYN: Menarche 12. Menopause  (after KIN). . Age of first full-term pregnancy 25. ~5 years of OCP use.. ECOG 0  repeat colonoscopy 2025 (Dr. Rene Hall @ [facility]) - known malignant mass was encountered at 10 cm from anal verge - appeared much smaller than initial. remained some residual tissue, ulcerations noted w/o bleeding, tattoo was seen proximal to lesion *bx positive for colonic mucosa w/ tiny foci of carcinoma c/w hx of colorectal primary  liver protocol MR (w/ EOVIST) 2025 - 1.1 cm & 7 mm hypointense foci in segment 7 of the right lobe at the site of previously seen larger mets - no new or additional lesions  - few small left hepatic lobe cysts & 6 mm segment 3 hemangioma - unchanged 3.4 cm cystic lesion in the pancreas tail w/ layering debris & a thick enhancing wall - differentials include cystic NET  2025 - Dulce Maria is here for an initial visit, accompanied by her . She is feeling well overall, especially as she is off chemotherapy. Her last cycle of FOLFOX was on 2025. She denies any abdominal pain, nausea/vomiting or diarrhea, appetite and weight are stable. She is tentatively scheduled for a joint surgery w/ me & Dr. Kelley on 2025.

## 2025-07-24 NOTE — HISTORY OF PRESENT ILLNESS
[de-identified] : Ms. DULCE MARIA CANNON is an 84-year-old woman, referred by colleague Dr. Kimberly Kelley for consultation regarding metastatic colon cancer, here for an initial visit.  Dulce Maria initially presented to [facility] in 2025 w/ symptomatic anemia & rectal bleeding. On colonoscopy she was found to have a high rectal mass, 15 cm from anal verge - path positive for moderately differentiated invasive adeno, MMR-P.  colonoscopy 2025 ([facility]), Dr. Petre Huynh - at this time last Cscope was ~10 years prior  - mucosal rectal mass w/ ulceration, granularity, friability, erythema & irregular margins w/ contact bleeding noted, 10-15 cm from anal verge & occupying 75% of the circumference of the lumen, tattoo 5 cm distally (@ 20 cm) *bx positive for adeno  - multiple other polyps removed   EUS 2025 ([facility]) Dr. Rajiv Campos (after imaging showed findings c/f pancreas mass) - PD nondilated, measuring 4.4 mm at pancreas neck - 2.1 cm thick-walled peripancreatic cyst was seen near the tail of the pancreas & appeared to have a fluid level within. CBD not dilated, normal celiac axis, FNA performed **path positive for malignant cells, c/w PNET w/ cystic changes, ki67: <1 %  US biopsy of liver lesion 2025 - positive for metastatic adenocarcinoma c/w colorectal primary   CT C/A/P 2025 - previous circumferential rectal sigmoid mass is not identified on current exam - bilobar hepatic mets have decreased in size & conspicuity since 2025 - no evidence of mets in chest - unchanged 4.1 cm thick-walled cystic lesion in pancreas tail   She started tx w/ FOLFOX 3/2025, completed 7 cycles as of 2025.  PMH/PSH: B/L metachronous breast cancer (left stage 1A , lumpectomy & RT, triple negative right stage 1A 2011 w/ lumpectomy, adjuvant TC & RT, RIGHT breast hormone positive 2023 followed by lumpectomy & RT - started Anastrozole and ultimately stopped r/t complications, was tx at [facility] [place]) KIN-BSO (~ for fibroids w/ no HRT), DVT (2024), osteoporosis  Meds:  Eliquis 2.5 mg BID, Calcium supplements,  ALL:  denies  SH:  denies tobacco or ETOH use, lives w/ her , retired  FH: mother  from metastatic breast cancer (30's), bother w/ lung cancer (50's), maternal uncle  from colon cancer (70's) GYN: Menarche 12. Menopause  (after KIN). . Age of first full-term pregnancy 25. ~5 years of OCP use.. ECOG 0  repeat colonoscopy 2025 (Dr. Rene Hall @ [facility]) - known malignant mass was encountered at 10 cm from anal verge - appeared much smaller than initial. remained some residual tissue, ulcerations noted w/o bleeding, tattoo was seen proximal to lesion *bx positive for colonic mucosa w/ tiny foci of carcinoma c/w hx of colorectal primary  liver protocol MR (w/ EOVIST) 2025 - 1.1 cm & 7 mm hypointense foci in segment 7 of the right lobe at the site of previously seen larger mets - no new or additional lesions  - few small left hepatic lobe cysts & 6 mm segment 3 hemangioma - unchanged 3.4 cm cystic lesion in the pancreas tail w/ layering debris & a thick enhancing wall - differentials include cystic NET  2025 - Dulce Maria is here for an initial visit, accompanied by her . She is feeling well overall, especially as she is off chemotherapy. Her last cycle of FOLFOX was on 2025. She denies any abdominal pain, nausea/vomiting or diarrhea, appetite and weight are stable. She is tentatively scheduled for a joint surgery w/ me & Dr. Kelley on 2025.

## 2025-07-24 NOTE — CONSULT LETTER
[Dear  ___] : Dear  [unfilled], [Consult Letter:] : I had the pleasure of evaluating your patient, [unfilled]. [Please see my note below.] : Please see my note below. [Consult Closing:] : Thank you very much for allowing me to participate in the care of this patient.  If you have any questions, please do not hesitate to contact me. [Sincerely,] : Sincerely, [DrSara  ___] : Dr. CUELLAR [DrSara ___] : Dr. CUELLAR [FreeTextEntry2] : Kimberly Kelley MD [FreeTextEntry3] : Hemant Calderon MD Surgical Oncology [facility] Office: [phone] Cell: [phone]

## 2025-07-24 NOTE — REASON FOR VISIT
[Follow-Up Visit] : a follow-up visit for [Colon Cancer] : colon cancer [FreeTextEntry2] : w/ liver mets

## 2025-07-24 NOTE — CONSULT LETTER
[Dear  ___] : Dear  [unfilled], [Consult Letter:] : I had the pleasure of evaluating your patient, [unfilled]. [Please see my note below.] : Please see my note below. [Consult Closing:] : Thank you very much for allowing me to participate in the care of this patient.  If you have any questions, please do not hesitate to contact me. [Sincerely,] : Sincerely, [DrSara  ___] : Dr. CUELLAR [DrSara ___] : Dr. CUELLAR [FreeTextEntry2] : Kimberly Kelley MD [FreeTextEntry3] : Hemant Calderon MD Surgical Oncology [facility] Office: [phone] Cell: [phone]

## 2025-07-24 NOTE — ASSESSMENT
[FreeTextEntry1] :  We discussed the plan for a combined robotic rectosigmoid resection, and liver resection/ablation with Dr. Kelley.  We discussed the risks, benefits and alternatives of the procedure.  We also discussed post operative expectations and possible complications.  Dulce Maria expresses understanding and agrees to proceed.  All medical entries were at my, Dr. Hemant Calderon, direction. I have reviewed the chart and agree that the record accurately reflects my personal performance of the history, physical exam, assessment, and plan.  Our office nurse practitioner was present for the duration of the office visit.

## 2025-07-24 NOTE — HISTORY OF PRESENT ILLNESS
[de-identified] : Ms. DULCE MARIA CANNON is an 84-year-old woman, referred by colleague Dr. Kimberly Kelley for consultation regarding metastatic colon cancer, here for an initial visit.  Dulce Maria initially presented to [facility] in 2025 w/ symptomatic anemia & rectal bleeding. On colonoscopy she was found to have a high rectal mass, 15 cm from anal verge - path positive for moderately differentiated invasive adeno, MMR-P.  colonoscopy 2025 ([facility]), Dr. Peter uHynh - at this time last Cscope was ~10 years prior  - mucosal rectal mass w/ ulceration, granularity, friability, erythema & irregular margins w/ contact bleeding noted, 10-15 cm from anal verge & occupying 75% of the circumference of the lumen, tattoo 5 cm distally (@ 20 cm) *bx positive for adeno  - multiple other polyps removed   EUS 2025 ([facility]) Dr. Rajiv Campos (after imaging showed findings c/f pancreas mass) - PD nondilated, measuring 4.4 mm at pancreas neck - 2.1 cm thick-walled peripancreatic cyst was seen near the tail of the pancreas & appeared to have a fluid level within. CBD not dilated, normal celiac axis, FNA performed **path positive for malignant cells, c/w PNET w/ cystic changes, ki67: <1 %  US biopsy of liver lesion 2025 - positive for metastatic adenocarcinoma c/w colorectal primary   CT C/A/P 2025 - previous circumferential rectal sigmoid mass is not identified on current exam - bilobar hepatic mets have decreased in size & conspicuity since 2025 - no evidence of mets in chest - unchanged 4.1 cm thick-walled cystic lesion in pancreas tail   She started tx w/ FOLFOX 3/2025, completed 7 cycles as of 2025.  PMH/PSH: B/L metachronous breast cancer (left stage 1A , lumpectomy & RT, triple negative right stage 1A 2011 w/ lumpectomy, adjuvant TC & RT, RIGHT breast hormone positive 2023 followed by lumpectomy & RT - started Anastrozole and ultimately stopped r/t complications, was tx at [facility] [place]) KIN-BSO (~ for fibroids w/ no HRT), DVT (2024), osteoporosis  Meds:  Eliquis 2.5 mg BID, Calcium supplements,  ALL:  denies  SH:  denies tobacco or ETOH use, lives w/ her , retired  FH: mother  from metastatic breast cancer (30's), bother w/ lung cancer (50's), maternal uncle  from colon cancer (70's) GYN: Menarche 12. Menopause  (after KIN). . Age of first full-term pregnancy 25. ~5 years of OCP use.. ECOG 0  repeat colonoscopy 2025 (Dr. Rene Hall @ [facility]) - known malignant mass was encountered at 10 cm from anal verge - appeared much smaller than initial. remained some residual tissue, ulcerations noted w/o bleeding, tattoo was seen proximal to lesion *bx positive for colonic mucosa w/ tiny foci of carcinoma c/w hx of colorectal primary  liver protocol MR (w/ EOVIST) 2025 - 1.1 cm & 7 mm hypointense foci in segment 7 of the right lobe at the site of previously seen larger mets - no new or additional lesions  - few small left hepatic lobe cysts & 6 mm segment 3 hemangioma - unchanged 3.4 cm cystic lesion in the pancreas tail w/ layering debris & a thick enhancing wall - differentials include cystic NET  2025 - Dulce Maria is here for an initial visit, accompanied by her . She is feeling well overall, especially as she is off chemotherapy. Her last cycle of FOLFOX was on 2025. She denies any abdominal pain, nausea/vomiting or diarrhea, appetite and weight are stable. She is tentatively scheduled for a joint surgery w/ me & Dr. Kelley on 2025.